# Patient Record
Sex: MALE | Race: WHITE | HISPANIC OR LATINO | Employment: FULL TIME | URBAN - METROPOLITAN AREA
[De-identification: names, ages, dates, MRNs, and addresses within clinical notes are randomized per-mention and may not be internally consistent; named-entity substitution may affect disease eponyms.]

---

## 2024-01-08 ENCOUNTER — HOSPITAL ENCOUNTER (OUTPATIENT)
Dept: RADIOLOGY | Facility: EXTERNAL LOCATION | Age: 24
Discharge: HOME | End: 2024-01-08

## 2024-01-08 DIAGNOSIS — M25.512 LEFT SHOULDER PAIN, UNSPECIFIED CHRONICITY: ICD-10-CM

## 2024-01-10 ENCOUNTER — APPOINTMENT (OUTPATIENT)
Dept: PRIMARY CARE | Facility: CLINIC | Age: 24
End: 2024-01-10
Payer: COMMERCIAL

## 2024-02-07 ENCOUNTER — APPOINTMENT (OUTPATIENT)
Dept: PRIMARY CARE | Facility: CLINIC | Age: 24
End: 2024-02-07
Payer: COMMERCIAL

## 2024-03-13 ENCOUNTER — OFFICE VISIT (OUTPATIENT)
Dept: PRIMARY CARE | Facility: CLINIC | Age: 24
End: 2024-03-13
Payer: COMMERCIAL

## 2024-03-13 VITALS
WEIGHT: 315 LBS | RESPIRATION RATE: 18 BRPM | BODY MASS INDEX: 36.45 KG/M2 | HEART RATE: 91 BPM | OXYGEN SATURATION: 98 % | SYSTOLIC BLOOD PRESSURE: 138 MMHG | HEIGHT: 78 IN | DIASTOLIC BLOOD PRESSURE: 86 MMHG | TEMPERATURE: 97.4 F

## 2024-03-13 DIAGNOSIS — E66.9 OBESITY (BMI 30-39.9): ICD-10-CM

## 2024-03-13 DIAGNOSIS — R25.2 LEG CRAMPS: ICD-10-CM

## 2024-03-13 DIAGNOSIS — S43.402A SPRAIN OF LEFT SHOULDER, UNSPECIFIED SHOULDER SPRAIN TYPE, INITIAL ENCOUNTER: ICD-10-CM

## 2024-03-13 DIAGNOSIS — R06.83 SNORING: ICD-10-CM

## 2024-03-13 DIAGNOSIS — R06.81 APNEA: ICD-10-CM

## 2024-03-13 DIAGNOSIS — Z00.00 ROUTINE GENERAL MEDICAL EXAMINATION AT A HEALTH CARE FACILITY: Primary | ICD-10-CM

## 2024-03-13 PROCEDURE — 1036F TOBACCO NON-USER: CPT | Performed by: FAMILY MEDICINE

## 2024-03-13 PROCEDURE — 99385 PREV VISIT NEW AGE 18-39: CPT | Performed by: FAMILY MEDICINE

## 2024-03-13 PROCEDURE — 99203 OFFICE O/P NEW LOW 30 MIN: CPT | Performed by: FAMILY MEDICINE

## 2024-03-13 RX ORDER — ALBUTEROL SULFATE 90 UG/1
2 AEROSOL, METERED RESPIRATORY (INHALATION) EVERY 6 HOURS PRN
COMMUNITY
Start: 2017-12-08

## 2024-03-13 ASSESSMENT — COLUMBIA-SUICIDE SEVERITY RATING SCALE - C-SSRS
2. HAVE YOU ACTUALLY HAD ANY THOUGHTS OF KILLING YOURSELF?: NO
1. IN THE PAST MONTH, HAVE YOU WISHED YOU WERE DEAD OR WISHED YOU COULD GO TO SLEEP AND NOT WAKE UP?: NO
6. HAVE YOU EVER DONE ANYTHING, STARTED TO DO ANYTHING, OR PREPARED TO DO ANYTHING TO END YOUR LIFE?: NO

## 2024-03-13 ASSESSMENT — ENCOUNTER SYMPTOMS
DEPRESSION: 0
GASTROINTESTINAL NEGATIVE: 1
NEUROLOGICAL NEGATIVE: 1
OCCASIONAL FEELINGS OF UNSTEADINESS: 0
CONSTITUTIONAL NEGATIVE: 1
RESPIRATORY NEGATIVE: 1
CARDIOVASCULAR NEGATIVE: 1
LOSS OF SENSATION IN FEET: 0
MUSCULOSKELETAL NEGATIVE: 1

## 2024-03-13 ASSESSMENT — PROMIS GLOBAL HEALTH SCALE
CARRYOUT_SOCIAL_ACTIVITIES: GOOD
RATE_MENTAL_HEALTH: FAIR
RATE_PHYSICAL_HEALTH: FAIR
CARRYOUT_PHYSICAL_ACTIVITIES: A LITTLE
RATE_AVERAGE_PAIN: 6
RATE_GENERAL_HEALTH: FAIR
RATE_AVERAGE_FATIGUE: MILD
RATE_QUALITY_OF_LIFE: FAIR
EMOTIONAL_PROBLEMS: SOMETIMES
RATE_SOCIAL_SATISFACTION: FAIR

## 2024-03-13 ASSESSMENT — PATIENT HEALTH QUESTIONNAIRE - PHQ9
1. LITTLE INTEREST OR PLEASURE IN DOING THINGS: NOT AT ALL
2. FEELING DOWN, DEPRESSED OR HOPELESS: NOT AT ALL
SUM OF ALL RESPONSES TO PHQ9 QUESTIONS 1 AND 2: 0

## 2024-03-13 ASSESSMENT — PAIN SCALES - GENERAL: PAINLEVEL: 4

## 2024-03-13 NOTE — PROGRESS NOTES
"Subjective   Patient ID: Uriel Gomez is a 23 y.o. male who presents for New Patient Visit (Pt is here to establish care, pt reports injury to left shoulder and would like it checked. Pt reports snoring and has concerns of sleep apnea. Pt would also like his thyroid checked.).    HPI chol, htn and diabetes in family.   Works at [employer].  He was lifting about 6 wks ago and felt a pop in his left shoulder and has been somewhat sore.  Occasional leg cramps.   He does snore a lot and has hypersomnia.  They have heard him stop breathing frequently recently also.  He drinks about 3 sodes and 2 16oz sweet teas daily.  He does not exercise.   Review of Systems   Constitutional: Negative.    HENT: Negative.     Respiratory: Negative.     Cardiovascular: Negative.    Gastrointestinal: Negative.    Genitourinary: Negative.    Musculoskeletal: Negative.    Neurological: Negative.        Objective   /86 (BP Location: Right arm, Patient Position: Sitting, BP Cuff Size: Large adult)   Pulse 91   Temp 36.3 °C (97.4 °F) (Temporal)   Resp 18   Ht 2.007 m (6' 7\")   Wt (!) 160 kg (352 lb)   SpO2 98%   BMI 39.65 kg/m²     Physical Exam  Vitals and nursing note reviewed.   Constitutional:       General: He is not in acute distress.  HENT:      Right Ear: Tympanic membrane and ear canal normal.      Left Ear: Tympanic membrane and ear canal normal.      Nose: Nose normal. No rhinorrhea.      Mouth/Throat:      Pharynx: Oropharynx is clear. No oropharyngeal exudate or posterior oropharyngeal erythema.      Comments: Dentition wnl  Eyes:      Extraocular Movements: Extraocular movements intact.      Conjunctiva/sclera: Conjunctivae normal.      Pupils: Pupils are equal, round, and reactive to light.   Neck:      Vascular: No carotid bruit.   Cardiovascular:      Rate and Rhythm: Normal rate and regular rhythm.      Heart sounds: Normal heart sounds. No murmur heard.  Pulmonary:      Breath sounds: Normal breath sounds. No " wheezing or rhonchi.   Abdominal:      General: Bowel sounds are normal. There is no distension.      Palpations: Abdomen is soft. There is no mass.      Tenderness: There is no abdominal tenderness. There is no guarding or rebound.      Hernia: No hernia is present.   Musculoskeletal:         General: No swelling or tenderness. Normal range of motion.      Cervical back: Normal range of motion and neck supple.      Comments: Left shoulder with slight click and mild tenderness over anterior shoulder with abduction to 90 degrees.  Mild tenderness ac jt.  Mild tenderness with internal rotation.    Lymphadenopathy:      Cervical: No cervical adenopathy.   Skin:     General: Skin is warm.      Findings: No rash.   Neurological:      General: No focal deficit present.      Mental Status: He is alert.         Assessment/Plan   Problem List Items Addressed This Visit    None  Visit Diagnoses         Codes    Routine general medical examination at a health care facility    -  Primary Z00.00    Relevant Orders    CBC and Auto Differential    Comprehensive Metabolic Panel    Lipid Panel    TSH with reflex to Free T4 if abnormal    Sprain of left shoulder, unspecified shoulder sprain type, initial encounter     S43.402A    Relevant Orders    Referral to Physical Therapy    Apnea     R06.81    Relevant Orders    In-Center Sleep Study (Non-Sleep Provider Only)    Snoring     R06.83    Relevant Orders    In-Center Sleep Study (Non-Sleep Provider Only)    Obesity (BMI 30-39.9)     E66.9    Relevant Orders    In-Center Sleep Study (Non-Sleep Provider Only)    Leg cramps     R25.2          Discussed dc soda/sweet tea.  Work on good diet/exercise and wt loss.  Will call with results. And start PT on shoulder.

## 2024-03-21 ENCOUNTER — CLINICAL SUPPORT (OUTPATIENT)
Dept: PRIMARY CARE | Facility: CLINIC | Age: 24
End: 2024-03-21
Payer: COMMERCIAL

## 2024-03-21 DIAGNOSIS — Z00.00 ROUTINE GENERAL MEDICAL EXAMINATION AT A HEALTH CARE FACILITY: ICD-10-CM

## 2024-03-21 LAB
ALBUMIN SERPL-MCNC: 4.5 G/DL (ref 3.5–5)
ALP BLD-CCNC: 51 U/L (ref 35–125)
ALT SERPL-CCNC: 39 U/L (ref 5–40)
ANION GAP SERPL CALC-SCNC: 11 MMOL/L
AST SERPL-CCNC: 20 U/L (ref 5–40)
BASOPHILS # BLD AUTO: 0.04 X10*3/UL (ref 0–0.1)
BASOPHILS NFR BLD AUTO: 0.5 %
BILIRUB SERPL-MCNC: 0.5 MG/DL (ref 0.1–1.2)
BUN SERPL-MCNC: 10 MG/DL (ref 8–25)
CALCIUM SERPL-MCNC: 9.4 MG/DL (ref 8.5–10.4)
CHLORIDE SERPL-SCNC: 105 MMOL/L (ref 97–107)
CHOLEST SERPL-MCNC: 151 MG/DL (ref 133–200)
CHOLEST/HDLC SERPL: 4.7 {RATIO}
CO2 SERPL-SCNC: 25 MMOL/L (ref 24–31)
CREAT SERPL-MCNC: 0.7 MG/DL (ref 0.4–1.6)
EGFRCR SERPLBLD CKD-EPI 2021: >90 ML/MIN/1.73M*2
EOSINOPHIL # BLD AUTO: 0.16 X10*3/UL (ref 0–0.7)
EOSINOPHIL NFR BLD AUTO: 2.1 %
ERYTHROCYTE [DISTWIDTH] IN BLOOD BY AUTOMATED COUNT: 13.7 % (ref 11.5–14.5)
GLUCOSE SERPL-MCNC: 91 MG/DL (ref 65–99)
HCT VFR BLD AUTO: 44.9 % (ref 41–52)
HDLC SERPL-MCNC: 32 MG/DL
HGB BLD-MCNC: 14.1 G/DL (ref 13.5–17.5)
IMM GRANULOCYTES # BLD AUTO: 0.05 X10*3/UL (ref 0–0.7)
IMM GRANULOCYTES NFR BLD AUTO: 0.7 % (ref 0–0.9)
LDLC SERPL CALC-MCNC: 89 MG/DL (ref 65–130)
LYMPHOCYTES # BLD AUTO: 2.53 X10*3/UL (ref 1.2–4.8)
LYMPHOCYTES NFR BLD AUTO: 33.5 %
MCH RBC QN AUTO: 29.7 PG (ref 26–34)
MCHC RBC AUTO-ENTMCNC: 31.4 G/DL (ref 32–36)
MCV RBC AUTO: 95 FL (ref 80–100)
MONOCYTES # BLD AUTO: 0.64 X10*3/UL (ref 0.1–1)
MONOCYTES NFR BLD AUTO: 8.5 %
NEUTROPHILS # BLD AUTO: 4.13 X10*3/UL (ref 1.2–7.7)
NEUTROPHILS NFR BLD AUTO: 54.7 %
NRBC BLD-RTO: 0 /100 WBCS (ref 0–0)
PLATELET # BLD AUTO: 229 X10*3/UL (ref 150–450)
POTASSIUM SERPL-SCNC: 4.5 MMOL/L (ref 3.4–5.1)
PROT SERPL-MCNC: 7.7 G/DL (ref 5.9–7.9)
RBC # BLD AUTO: 4.75 X10*6/UL (ref 4.5–5.9)
SODIUM SERPL-SCNC: 141 MMOL/L (ref 133–145)
TRIGL SERPL-MCNC: 150 MG/DL (ref 40–150)
TSH SERPL DL<=0.05 MIU/L-ACNC: 1.37 MIU/L (ref 0.27–4.2)
WBC # BLD AUTO: 7.6 X10*3/UL (ref 4.4–11.3)

## 2024-03-21 PROCEDURE — 36415 COLL VENOUS BLD VENIPUNCTURE: CPT

## 2024-03-21 PROCEDURE — 80053 COMPREHEN METABOLIC PANEL: CPT

## 2024-03-21 PROCEDURE — 85025 COMPLETE CBC W/AUTO DIFF WBC: CPT

## 2024-03-21 PROCEDURE — 84443 ASSAY THYROID STIM HORMONE: CPT

## 2024-03-21 PROCEDURE — 83718 ASSAY OF LIPOPROTEIN: CPT

## 2024-04-16 ENCOUNTER — CLINICAL SUPPORT (OUTPATIENT)
Dept: SLEEP MEDICINE | Facility: CLINIC | Age: 24
End: 2024-04-16
Payer: COMMERCIAL

## 2024-04-16 DIAGNOSIS — R06.81 APNEA: ICD-10-CM

## 2024-04-16 DIAGNOSIS — G47.33 OBSTRUCTIVE SLEEP APNEA (ADULT) (PEDIATRIC): ICD-10-CM

## 2024-04-16 DIAGNOSIS — R06.83 SNORING: ICD-10-CM

## 2024-04-16 DIAGNOSIS — E66.9 OBESITY (BMI 30-39.9): ICD-10-CM

## 2024-04-16 PROCEDURE — 95810 POLYSOM 6/> YRS 4/> PARAM: CPT | Performed by: PSYCHIATRY & NEUROLOGY

## 2024-04-17 VITALS
DIASTOLIC BLOOD PRESSURE: 72 MMHG | OXYGEN SATURATION: 99 % | HEART RATE: 68 BPM | SYSTOLIC BLOOD PRESSURE: 127 MMHG | RESPIRATION RATE: 14 BRPM

## 2024-04-17 ASSESSMENT — SLEEP AND FATIGUE QUESTIONNAIRES
HOW LIKELY ARE YOU TO NOD OFF OR FALL ASLEEP WHILE SITTING AND TALKING TO SOMEONE: WOULD NEVER DOZE
HOW LIKELY ARE YOU TO NOD OFF OR FALL ASLEEP WHILE SITTING AND READING: MODERATE CHANCE OF DOZING
HOW LIKELY ARE YOU TO NOD OFF OR FALL ASLEEP WHILE LYING DOWN TO REST IN THE AFTERNOON WHEN CIRCUMSTANCES PERMIT: HIGH CHANCE OF DOZING
HOW LIKELY ARE YOU TO NOD OFF OR FALL ASLEEP WHILE WATCHING TV: WOULD NEVER DOZE
HOW LIKELY ARE YOU TO NOD OFF OR FALL ASLEEP IN A CAR, WHILE STOPPED FOR A FEW MINUTES IN TRAFFIC: WOULD NEVER DOZE
HOW LIKELY ARE YOU TO NOD OFF OR FALL ASLEEP WHILE SITTING QUIETLY AFTER LUNCH WITHOUT ALCOHOL: WOULD NEVER DOZE
ESS-CHAD TOTAL SCORE: 8
SITING INACTIVE IN A PUBLIC PLACE LIKE A CLASS ROOM OR A MOVIE THEATER: MODERATE CHANCE OF DOZING
HOW LIKELY ARE YOU TO NOD OFF OR FALL ASLEEP WHEN YOU ARE A PASSENGER IN A CAR FOR AN HOUR WITHOUT A BREAK: SLIGHT CHANCE OF DOZING

## 2024-04-17 NOTE — PROGRESS NOTES
[facility] TECH NOTE:     Patient: Uriel Gomez   MRN//AGE: [id number]  2000  23 y.o.   Technologist: Анна Hutchinson RRT-[facility]   Room: 106   Service Date: 2024        Sleep Testing Location: [facility]:     TECHNOLOGIST SLEEP STUDY PROCEDURE NOTE:   This sleep study is being conducted according to the policies and procedures outlined by the AAS accreditation standards.  The sleep study procedure and processes involved during this appointment was explained to the patient/patient’s family, questions were answered. The patient/family verbalized understanding.      The patient is a 23 y.o. year old male scheduled for a Diagnostic PSG Split night. he arrived for his appointment.      The study that was ultimately completed was a Diagnostic PSG .    The full study Was completed.  Patient questionnaires completed?: yes     Consents signed? yes    Initial Fall Risk Screening:     Uriel has not fallen in the last 6 months. his did not result in injury. Uriel does not have a fear of falling. He does not need assistance with sitting, standing, or walking. he does not need assistance walking in his home. he does not need assistance in an unfamiliar setting. The patient is notusing an assistive device.     Brief Study observations: Patient did not qualify for a Split study. Patient had Arousals of Respiratory, Spontaneaous and Limb movements. Patients Respiratory events were Obstructive, Hypopneas and Flow Limitations. Patient had intermittent, mild to moderate snoring. Patient was up 0 times throughout the night. Patient did go into REM.     Deviation to order/protocol and reason:       If PAP, which was preferred mask/pressure/mode:       Other:None    After the procedure, the patient/family was informed to ensure followup with ordering clinician for testing results.      Technologist: Анна Hutchinson RRT

## 2024-05-13 ENCOUNTER — APPOINTMENT (OUTPATIENT)
Dept: RADIOLOGY | Facility: HOSPITAL | Age: 24
End: 2024-05-13
Payer: COMMERCIAL

## 2024-05-13 ENCOUNTER — HOSPITAL ENCOUNTER (EMERGENCY)
Facility: HOSPITAL | Age: 24
Discharge: HOME | End: 2024-05-13
Attending: FAMILY MEDICINE
Payer: COMMERCIAL

## 2024-05-13 VITALS
SYSTOLIC BLOOD PRESSURE: 145 MMHG | DIASTOLIC BLOOD PRESSURE: 91 MMHG | TEMPERATURE: 97 F | HEIGHT: 78 IN | WEIGHT: 315 LBS | BODY MASS INDEX: 36.45 KG/M2 | HEART RATE: 76 BPM | OXYGEN SATURATION: 98 % | RESPIRATION RATE: 17 BRPM

## 2024-05-13 DIAGNOSIS — R10.31 RIGHT LOWER QUADRANT ABDOMINAL PAIN: Primary | ICD-10-CM

## 2024-05-13 LAB
ALBUMIN SERPL BCP-MCNC: 4.5 G/DL (ref 3.4–5)
ALP SERPL-CCNC: 43 U/L (ref 33–120)
ALT SERPL W P-5'-P-CCNC: 36 U/L (ref 10–52)
ANION GAP SERPL CALC-SCNC: 10 MMOL/L (ref 10–20)
APPEARANCE UR: CLEAR
AST SERPL W P-5'-P-CCNC: 21 U/L (ref 9–39)
BASOPHILS # BLD AUTO: 0.03 X10*3/UL (ref 0–0.1)
BASOPHILS NFR BLD AUTO: 0.4 %
BILIRUB SERPL-MCNC: 0.5 MG/DL (ref 0–1.2)
BILIRUB UR STRIP.AUTO-MCNC: NEGATIVE MG/DL
BUN SERPL-MCNC: 14 MG/DL (ref 6–23)
CALCIUM SERPL-MCNC: 9.3 MG/DL (ref 8.6–10.3)
CHLORIDE SERPL-SCNC: 107 MMOL/L (ref 98–107)
CO2 SERPL-SCNC: 27 MMOL/L (ref 21–32)
COLOR UR: ABNORMAL
CREAT SERPL-MCNC: 0.79 MG/DL (ref 0.5–1.3)
EGFRCR SERPLBLD CKD-EPI 2021: >90 ML/MIN/1.73M*2
EOSINOPHIL # BLD AUTO: 0.12 X10*3/UL (ref 0–0.7)
EOSINOPHIL NFR BLD AUTO: 1.5 %
ERYTHROCYTE [DISTWIDTH] IN BLOOD BY AUTOMATED COUNT: 13.5 % (ref 11.5–14.5)
GLUCOSE SERPL-MCNC: 85 MG/DL (ref 74–99)
GLUCOSE UR STRIP.AUTO-MCNC: NORMAL MG/DL
HCT VFR BLD AUTO: 39.1 % (ref 41–52)
HGB BLD-MCNC: 13.2 G/DL (ref 13.5–17.5)
IMM GRANULOCYTES # BLD AUTO: 0.03 X10*3/UL (ref 0–0.7)
IMM GRANULOCYTES NFR BLD AUTO: 0.4 % (ref 0–0.9)
KETONES UR STRIP.AUTO-MCNC: NEGATIVE MG/DL
LEUKOCYTE ESTERASE UR QL STRIP.AUTO: NEGATIVE
LIPASE SERPL-CCNC: 32 U/L (ref 9–82)
LYMPHOCYTES # BLD AUTO: 3.03 X10*3/UL (ref 1.2–4.8)
LYMPHOCYTES NFR BLD AUTO: 36.6 %
MCH RBC QN AUTO: 30.1 PG (ref 26–34)
MCHC RBC AUTO-ENTMCNC: 33.8 G/DL (ref 32–36)
MCV RBC AUTO: 89 FL (ref 80–100)
MONOCYTES # BLD AUTO: 0.66 X10*3/UL (ref 0.1–1)
MONOCYTES NFR BLD AUTO: 8 %
NEUTROPHILS # BLD AUTO: 4.4 X10*3/UL (ref 1.2–7.7)
NEUTROPHILS NFR BLD AUTO: 53.1 %
NITRITE UR QL STRIP.AUTO: NEGATIVE
NRBC BLD-RTO: 0 /100 WBCS (ref 0–0)
PH UR STRIP.AUTO: 6.5 [PH]
PLATELET # BLD AUTO: 232 X10*3/UL (ref 150–450)
POTASSIUM SERPL-SCNC: 3.7 MMOL/L (ref 3.5–5.3)
PROT SERPL-MCNC: 7.5 G/DL (ref 6.4–8.2)
PROT UR STRIP.AUTO-MCNC: NEGATIVE MG/DL
RBC # BLD AUTO: 4.39 X10*6/UL (ref 4.5–5.9)
RBC # UR STRIP.AUTO: NEGATIVE /UL
SODIUM SERPL-SCNC: 140 MMOL/L (ref 136–145)
SP GR UR STRIP.AUTO: 1.02
UROBILINOGEN UR STRIP.AUTO-MCNC: ABNORMAL MG/DL
WBC # BLD AUTO: 8.3 X10*3/UL (ref 4.4–11.3)

## 2024-05-13 PROCEDURE — 96361 HYDRATE IV INFUSION ADD-ON: CPT

## 2024-05-13 PROCEDURE — 36415 COLL VENOUS BLD VENIPUNCTURE: CPT | Performed by: FAMILY MEDICINE

## 2024-05-13 PROCEDURE — 96374 THER/PROPH/DIAG INJ IV PUSH: CPT | Mod: 59

## 2024-05-13 PROCEDURE — 74177 CT ABD & PELVIS W/CONTRAST: CPT

## 2024-05-13 PROCEDURE — 81003 URINALYSIS AUTO W/O SCOPE: CPT | Performed by: FAMILY MEDICINE

## 2024-05-13 PROCEDURE — 99284 EMERGENCY DEPT VISIT MOD MDM: CPT | Mod: 25

## 2024-05-13 PROCEDURE — 80053 COMPREHEN METABOLIC PANEL: CPT | Performed by: FAMILY MEDICINE

## 2024-05-13 PROCEDURE — 83690 ASSAY OF LIPASE: CPT | Performed by: FAMILY MEDICINE

## 2024-05-13 PROCEDURE — 85025 COMPLETE CBC W/AUTO DIFF WBC: CPT | Performed by: FAMILY MEDICINE

## 2024-05-13 PROCEDURE — 2550000001 HC RX 255 CONTRASTS: Performed by: FAMILY MEDICINE

## 2024-05-13 PROCEDURE — 74177 CT ABD & PELVIS W/CONTRAST: CPT | Performed by: RADIOLOGY

## 2024-05-13 PROCEDURE — 2500000004 HC RX 250 GENERAL PHARMACY W/ HCPCS (ALT 636 FOR OP/ED): Performed by: FAMILY MEDICINE

## 2024-05-13 RX ORDER — KETOROLAC TROMETHAMINE 15 MG/ML
15 INJECTION, SOLUTION INTRAMUSCULAR; INTRAVENOUS ONCE
Status: COMPLETED | OUTPATIENT
Start: 2024-05-13 | End: 2024-05-13

## 2024-05-13 RX ADMIN — SODIUM CHLORIDE 500 ML: 9 INJECTION, SOLUTION INTRAVENOUS at 20:33

## 2024-05-13 RX ADMIN — KETOROLAC TROMETHAMINE 15 MG: 15 INJECTION INTRAMUSCULAR; INTRAVENOUS at 20:33

## 2024-05-13 RX ADMIN — IOHEXOL 75 ML: 350 INJECTION, SOLUTION INTRAVENOUS at 21:46

## 2024-05-13 ASSESSMENT — PAIN SCALES - GENERAL
PAINLEVEL_OUTOF10: 1
PAINLEVEL_OUTOF10: 6
PAINLEVEL_OUTOF10: 7

## 2024-05-13 ASSESSMENT — PAIN DESCRIPTION - PAIN TYPE: TYPE: ACUTE PAIN

## 2024-05-13 ASSESSMENT — PAIN DESCRIPTION - FREQUENCY: FREQUENCY: CONSTANT/CONTINUOUS

## 2024-05-13 ASSESSMENT — PAIN DESCRIPTION - LOCATION: LOCATION: ABDOMEN

## 2024-05-13 ASSESSMENT — PAIN - FUNCTIONAL ASSESSMENT: PAIN_FUNCTIONAL_ASSESSMENT: 0-10

## 2024-05-13 ASSESSMENT — COLUMBIA-SUICIDE SEVERITY RATING SCALE - C-SSRS
6. HAVE YOU EVER DONE ANYTHING, STARTED TO DO ANYTHING, OR PREPARED TO DO ANYTHING TO END YOUR LIFE?: NO
2. HAVE YOU ACTUALLY HAD ANY THOUGHTS OF KILLING YOURSELF?: NO
1. IN THE PAST MONTH, HAVE YOU WISHED YOU WERE DEAD OR WISHED YOU COULD GO TO SLEEP AND NOT WAKE UP?: NO

## 2024-05-13 ASSESSMENT — PAIN DESCRIPTION - DESCRIPTORS: DESCRIPTORS: PRESSURE

## 2024-05-14 LAB — HOLD SPECIMEN: NORMAL

## 2024-05-14 NOTE — ED PROVIDER NOTES
HPI   Chief Complaint   Patient presents with    Abdominal Pain     Abdominal pain       23-year-old male comes to the ED with complaint of periumbilical/right lower quadrant abdominal pain that began earlier today.  Patient reports no other associate symptoms or complaints.  Patient reported as a heaviness states it was 6/10.  Patient has ED did not take anything to treat it and continue to persist and he was concerned and came to the ED for evaluation.  Patient in the ED is alert, cooperative, appeared uncomfortable, but in no distress.  Patient currently denies headache, neck pain, chest pain, back pain, shortness of breath, fevers, falls, recent travel, sick contacts, nausea/vomiting/diarrhea, change in diet, dysuria, hematuria, dizziness, and weakness.      History provided by:  Patient, medical records and significant other   used: No                        [place] Coma Scale Score: 15                     Patient History   History reviewed. No pertinent past medical history.  History reviewed. No pertinent surgical history.  Family History   Problem Relation Name Age of Onset    Mental illness Mother      Hypertension Mother      Diabetes Mother      Cancer Mother      Hypertension Father       Social History     Tobacco Use    Smoking status: Never    Smokeless tobacco: Never   Substance Use Topics    Alcohol use: Never    Drug use: Never       Physical Exam   ED Triage Vitals [05/13/24 1932]   Temperature Heart Rate Respirations BP   36.1 °C (97 °F) 82 16 (!) 145/92      Pulse Ox Temp Source Heart Rate Source Patient Position   96 % Tympanic -- Sitting      BP Location FiO2 (%)     Left arm --       Physical Exam  Vitals and nursing note reviewed.   Constitutional:       General: He is not in acute distress.     Appearance: He is well-developed.   HENT:      Head: Normocephalic and atraumatic.   Eyes:      Conjunctiva/sclera: Conjunctivae normal.   Cardiovascular:      Rate and Rhythm:  Normal rate and regular rhythm.      Pulses: Normal pulses.      Heart sounds: Normal heart sounds, S1 normal and S2 normal. No murmur heard.  Pulmonary:      Effort: Pulmonary effort is normal. No respiratory distress.      Breath sounds: Normal breath sounds.   Abdominal:      General: Abdomen is flat.      Palpations: Abdomen is soft.      Tenderness: There is abdominal tenderness in the right lower quadrant and periumbilical area. There is no right CVA tenderness, left CVA tenderness, guarding or rebound.      Hernia: No hernia is present.       Musculoskeletal:         General: No swelling.      Cervical back: Neck supple.   Skin:     General: Skin is warm and dry.      Capillary Refill: Capillary refill takes less than 2 seconds.   Neurological:      Mental Status: He is alert.   Psychiatric:         Mood and Affect: Mood normal.         ED Course & MDM   Diagnoses as of 05/14/24 0046   Right lower quadrant abdominal pain     Labs Reviewed   CBC WITH AUTO DIFFERENTIAL - Abnormal       Result Value    WBC 8.3      nRBC 0.0      RBC 4.39 (*)     Hemoglobin 13.2 (*)     Hematocrit 39.1 (*)     MCV 89      MCH 30.1      MCHC 33.8      RDW 13.5      Platelets 232      Neutrophils % 53.1      Immature Granulocytes %, Automated 0.4      Lymphocytes % 36.6      Monocytes % 8.0      Eosinophils % 1.5      Basophils % 0.4      Neutrophils Absolute 4.40      Immature Granulocytes Absolute, Automated 0.03      Lymphocytes Absolute 3.03      Monocytes Absolute 0.66      Eosinophils Absolute 0.12      Basophils Absolute 0.03     URINALYSIS WITH REFLEX CULTURE AND MICROSCOPIC - Abnormal    Color, Urine Light-Yellow      Appearance, Urine Clear      Specific Gravity, Urine 1.025      pH, Urine 6.5      Protein, Urine NEGATIVE      Glucose, Urine Normal      Blood, Urine NEGATIVE      Ketones, Urine NEGATIVE      Bilirubin, Urine NEGATIVE      Urobilinogen, Urine 3 (1+) (*)     Nitrite, Urine NEGATIVE      Leukocyte Esterase,  Urine NEGATIVE     COMPREHENSIVE METABOLIC PANEL - Normal    Glucose 85      Sodium 140      Potassium 3.7      Chloride 107      Bicarbonate 27      Anion Gap 10      Urea Nitrogen 14      Creatinine 0.79      eGFR >90      Calcium 9.3      Albumin 4.5      Alkaline Phosphatase 43      Total Protein 7.5      AST 21      Bilirubin, Total 0.5      ALT 36     LIPASE - Normal    Lipase 32      Narrative:     Venipuncture immediately after or during the administration of Metamizole may lead to falsely low results. Testing should be performed immediately prior to Metamizole dosing.   URINALYSIS WITH REFLEX CULTURE AND MICROSCOPIC    Narrative:     The following orders were created for panel order Urinalysis with Reflex Culture and Microscopic.  Procedure                               Abnormality         Status                     ---------                               -----------         ------                     Urinalysis with Reflex C...[[id number]]  Abnormal            Final result               Extra Urine Gray Tube[[id number]]                            In process                   Please view results for these tests on the individual orders.   EXTRA URINE GRAY TUBE     CT abdomen pelvis w IV contrast   Final Result   1.  Progressive hepatomegaly with evidence of steatosis.   2. Cholelithiasis.   3. Similar mild abdominal and pelvic lymphadenopathy.   4. Similar splenomegaly.             Signed by: Ruddy De La Cruz 5/13/2024 10:52 PM   Dictation workstation:   [id number]          Medical Decision Making  Patient upon arrival to the ED appeared to be anxious and uncomfortable but in no distress stable vital signs.  Discussed with patient presenting complaints and clinical findings.  Reviewed with patient his epic chart and counseled patient abdominal pain and appropriate approach to management/treatments.  After assessment and evaluation IV fluids are started, labs sent, imaging ordered, given IV Toradol, placed on  cardiac monitor, and observed.  After treatment and a period of rest patient was reassessed finally feeling much better, pain better controlled, no new physical complaints, tolerated p.o. challenge, and vital signs were stable.  Final results were reviewed discussed with patient/family and at this time patient was advised on need for dietary changes and better hydration.  Patient was also advised to follow-up with his primary care doctor for recheck and and reevaluation in several days and to review the findings on the CT scan.  Patient also advised return to ED if he feels any recurrence of abdominal pain for reassessment.  Patient stable and discharged home with family.    Amount and/or Complexity of Data Reviewed  External Data Reviewed: labs, radiology and notes.  Labs: ordered. Decision-making details documented in ED Course.  Radiology: ordered. Decision-making details documented in ED Course.    Risk  OTC drugs.        Procedure  Procedures     Rivas Barrientos MD  05/14/24 0132

## 2024-05-22 ENCOUNTER — APPOINTMENT (OUTPATIENT)
Dept: PRIMARY CARE | Facility: CLINIC | Age: 24
End: 2024-05-22
Payer: COMMERCIAL

## 2024-05-23 ENCOUNTER — OFFICE VISIT (OUTPATIENT)
Dept: PRIMARY CARE | Facility: CLINIC | Age: 24
End: 2024-05-23
Payer: COMMERCIAL

## 2024-05-23 VITALS
OXYGEN SATURATION: 98 % | BODY MASS INDEX: 40.56 KG/M2 | HEIGHT: 78 IN | TEMPERATURE: 98.9 F | DIASTOLIC BLOOD PRESSURE: 94 MMHG | SYSTOLIC BLOOD PRESSURE: 160 MMHG | RESPIRATION RATE: 20 BRPM | HEART RATE: 106 BPM

## 2024-05-23 DIAGNOSIS — G47.33 OBSTRUCTIVE SLEEP APNEA SYNDROME: ICD-10-CM

## 2024-05-23 DIAGNOSIS — R16.2 HEPATOSPLENOMEGALY: ICD-10-CM

## 2024-05-23 DIAGNOSIS — K80.20 CALCULUS OF GALLBLADDER WITHOUT CHOLECYSTITIS WITHOUT OBSTRUCTION: Primary | ICD-10-CM

## 2024-05-23 PROCEDURE — 99214 OFFICE O/P EST MOD 30 MIN: CPT | Performed by: FAMILY MEDICINE

## 2024-05-23 ASSESSMENT — PAIN SCALES - GENERAL: PAINLEVEL: 4

## 2024-05-23 ASSESSMENT — PATIENT HEALTH QUESTIONNAIRE - PHQ9
2. FEELING DOWN, DEPRESSED OR HOPELESS: NOT AT ALL
1. LITTLE INTEREST OR PLEASURE IN DOING THINGS: NOT AT ALL
SUM OF ALL RESPONSES TO PHQ9 QUESTIONS 1 AND 2: 0

## 2024-05-23 ASSESSMENT — ENCOUNTER SYMPTOMS
OCCASIONAL FEELINGS OF UNSTEADINESS: 0
DEPRESSION: 0
LOSS OF SENSATION IN FEET: 0

## 2024-05-23 NOTE — PROGRESS NOTES
"Subjective   Patient ID: Uriel Gomez is a 23 y.o. male who presents for Follow-up (HERE FOR A ER FOLLOW UP  FOR ABDOMINAL PAIN  HE WOULD LIKE TO GO OVER RESULTS.  HE WENT ON MAY 13TH).    HPI   He went to the er on 5/13 for umbilical and RLQ pain starting that day. He had ct abdomen that showed some hepato and splenomegaly as well as cholelithiasis.   Review of Systems  See HPI  Objective   BP (!) 160/94 (BP Location: Right arm, Patient Position: Sitting, BP Cuff Size: Adult)   Pulse 106   Temp 37.2 °C (98.9 °F) (Skin)   Resp 20   Ht 2.007 m (6' 7\")   SpO2 98%   BMI 40.56 kg/m²     Physical Exam  Constitutional:       General: He is not in acute distress.     Appearance: Normal appearance.   Cardiovascular:      Rate and Rhythm: Normal rate and regular rhythm.      Heart sounds: No murmur heard.  Pulmonary:      Breath sounds: Normal breath sounds. No wheezing.   Abdominal:      Comments: Mild RUQ tenderness to palpation   Neurological:      Mental Status: He is alert.         Assessment/Plan   Problem List Items Addressed This Visit    None  Visit Diagnoses         Codes    Calculus of gallbladder without cholecystitis without obstruction    -  Primary K80.20    Relevant Orders    Referral to General Surgery    Hepatosplenomegaly     R16.2    Relevant Orders    Referral to Gastroenterology    Obstructive sleep apnea syndrome     G47.33               "

## 2024-05-28 ENCOUNTER — APPOINTMENT (OUTPATIENT)
Dept: RADIOLOGY | Facility: HOSPITAL | Age: 24
End: 2024-05-28
Payer: COMMERCIAL

## 2024-05-28 ENCOUNTER — HOSPITAL ENCOUNTER (EMERGENCY)
Facility: HOSPITAL | Age: 24
Discharge: HOME | End: 2024-05-29
Payer: COMMERCIAL

## 2024-05-28 ENCOUNTER — APPOINTMENT (OUTPATIENT)
Dept: CARDIOLOGY | Facility: HOSPITAL | Age: 24
End: 2024-05-28
Payer: COMMERCIAL

## 2024-05-28 DIAGNOSIS — K80.50 BILIARY COLIC: ICD-10-CM

## 2024-05-28 DIAGNOSIS — K80.20 CALCULUS OF GALLBLADDER WITHOUT CHOLECYSTITIS WITHOUT OBSTRUCTION: ICD-10-CM

## 2024-05-28 DIAGNOSIS — R10.11 RIGHT UPPER QUADRANT ABDOMINAL PAIN: Primary | ICD-10-CM

## 2024-05-28 LAB
ALBUMIN SERPL BCP-MCNC: 4.4 G/DL (ref 3.4–5)
ALP SERPL-CCNC: 45 U/L (ref 33–120)
ALT SERPL W P-5'-P-CCNC: 44 U/L (ref 10–52)
ANION GAP SERPL CALC-SCNC: 12 MMOL/L (ref 10–20)
APPEARANCE UR: CLEAR
AST SERPL W P-5'-P-CCNC: 21 U/L (ref 9–39)
BACTERIA #/AREA URNS AUTO: ABNORMAL /HPF
BASOPHILS # BLD AUTO: 0.04 X10*3/UL (ref 0–0.1)
BASOPHILS NFR BLD AUTO: 0.5 %
BILIRUB SERPL-MCNC: 0.5 MG/DL (ref 0–1.2)
BILIRUB UR STRIP.AUTO-MCNC: NEGATIVE MG/DL
BUN SERPL-MCNC: 13 MG/DL (ref 6–23)
CALCIUM SERPL-MCNC: 9.6 MG/DL (ref 8.6–10.3)
CARDIAC TROPONIN I PNL SERPL HS: <3 NG/L (ref 0–20)
CHLORIDE SERPL-SCNC: 107 MMOL/L (ref 98–107)
CO2 SERPL-SCNC: 25 MMOL/L (ref 21–32)
COLOR UR: YELLOW
CREAT SERPL-MCNC: 0.78 MG/DL (ref 0.5–1.3)
EGFRCR SERPLBLD CKD-EPI 2021: >90 ML/MIN/1.73M*2
EOSINOPHIL # BLD AUTO: 0.16 X10*3/UL (ref 0–0.7)
EOSINOPHIL NFR BLD AUTO: 2 %
ERYTHROCYTE [DISTWIDTH] IN BLOOD BY AUTOMATED COUNT: 13.4 % (ref 11.5–14.5)
GLUCOSE SERPL-MCNC: 93 MG/DL (ref 74–99)
GLUCOSE UR STRIP.AUTO-MCNC: NORMAL MG/DL
HCT VFR BLD AUTO: 38.8 % (ref 41–52)
HGB BLD-MCNC: 13.2 G/DL (ref 13.5–17.5)
IMM GRANULOCYTES # BLD AUTO: 0.02 X10*3/UL (ref 0–0.7)
IMM GRANULOCYTES NFR BLD AUTO: 0.3 % (ref 0–0.9)
KETONES UR STRIP.AUTO-MCNC: NEGATIVE MG/DL
LACTATE SERPL-SCNC: 1.1 MMOL/L (ref 0.4–2)
LEUKOCYTE ESTERASE UR QL STRIP.AUTO: NEGATIVE
LIPASE SERPL-CCNC: 33 U/L (ref 9–82)
LYMPHOCYTES # BLD AUTO: 2.8 X10*3/UL (ref 1.2–4.8)
LYMPHOCYTES NFR BLD AUTO: 35.3 %
MAGNESIUM SERPL-MCNC: 1.78 MG/DL (ref 1.6–2.4)
MCH RBC QN AUTO: 30.1 PG (ref 26–34)
MCHC RBC AUTO-ENTMCNC: 34 G/DL (ref 32–36)
MCV RBC AUTO: 89 FL (ref 80–100)
MONOCYTES # BLD AUTO: 0.84 X10*3/UL (ref 0.1–1)
MONOCYTES NFR BLD AUTO: 10.6 %
MUCOUS THREADS #/AREA URNS AUTO: ABNORMAL /LPF
NEUTROPHILS # BLD AUTO: 4.07 X10*3/UL (ref 1.2–7.7)
NEUTROPHILS NFR BLD AUTO: 51.3 %
NITRITE UR QL STRIP.AUTO: NEGATIVE
NRBC BLD-RTO: 0 /100 WBCS (ref 0–0)
PH UR STRIP.AUTO: 6 [PH]
PLATELET # BLD AUTO: 218 X10*3/UL (ref 150–450)
POTASSIUM SERPL-SCNC: 3.9 MMOL/L (ref 3.5–5.3)
PROT SERPL-MCNC: 7.6 G/DL (ref 6.4–8.2)
PROT UR STRIP.AUTO-MCNC: NORMAL MG/DL
RBC # BLD AUTO: 4.38 X10*6/UL (ref 4.5–5.9)
RBC # UR STRIP.AUTO: NEGATIVE /UL
RBC #/AREA URNS AUTO: ABNORMAL /HPF
SODIUM SERPL-SCNC: 140 MMOL/L (ref 136–145)
SP GR UR STRIP.AUTO: 1.03
UROBILINOGEN UR STRIP.AUTO-MCNC: NORMAL MG/DL
WBC # BLD AUTO: 7.9 X10*3/UL (ref 4.4–11.3)
WBC #/AREA URNS AUTO: ABNORMAL /HPF

## 2024-05-28 PROCEDURE — 83735 ASSAY OF MAGNESIUM: CPT

## 2024-05-28 PROCEDURE — 76705 ECHO EXAM OF ABDOMEN: CPT

## 2024-05-28 PROCEDURE — 2500000004 HC RX 250 GENERAL PHARMACY W/ HCPCS (ALT 636 FOR OP/ED)

## 2024-05-28 PROCEDURE — 85025 COMPLETE CBC W/AUTO DIFF WBC: CPT

## 2024-05-28 PROCEDURE — 93005 ELECTROCARDIOGRAM TRACING: CPT

## 2024-05-28 PROCEDURE — 84484 ASSAY OF TROPONIN QUANT: CPT

## 2024-05-28 PROCEDURE — 96361 HYDRATE IV INFUSION ADD-ON: CPT

## 2024-05-28 PROCEDURE — 71045 X-RAY EXAM CHEST 1 VIEW: CPT | Performed by: RADIOLOGY

## 2024-05-28 PROCEDURE — 74177 CT ABD & PELVIS W/CONTRAST: CPT | Performed by: RADIOLOGY

## 2024-05-28 PROCEDURE — 36415 COLL VENOUS BLD VENIPUNCTURE: CPT

## 2024-05-28 PROCEDURE — 99285 EMERGENCY DEPT VISIT HI MDM: CPT | Mod: 25

## 2024-05-28 PROCEDURE — 76705 ECHO EXAM OF ABDOMEN: CPT | Performed by: RADIOLOGY

## 2024-05-28 PROCEDURE — 74177 CT ABD & PELVIS W/CONTRAST: CPT

## 2024-05-28 PROCEDURE — 80053 COMPREHEN METABOLIC PANEL: CPT

## 2024-05-28 PROCEDURE — 2550000001 HC RX 255 CONTRASTS

## 2024-05-28 PROCEDURE — 96375 TX/PRO/DX INJ NEW DRUG ADDON: CPT

## 2024-05-28 PROCEDURE — 71045 X-RAY EXAM CHEST 1 VIEW: CPT

## 2024-05-28 PROCEDURE — 83605 ASSAY OF LACTIC ACID: CPT

## 2024-05-28 PROCEDURE — 81001 URINALYSIS AUTO W/SCOPE: CPT

## 2024-05-28 PROCEDURE — 96374 THER/PROPH/DIAG INJ IV PUSH: CPT

## 2024-05-28 PROCEDURE — 83690 ASSAY OF LIPASE: CPT

## 2024-05-28 PROCEDURE — 96376 TX/PRO/DX INJ SAME DRUG ADON: CPT

## 2024-05-28 RX ORDER — ONDANSETRON HYDROCHLORIDE 2 MG/ML
INJECTION, SOLUTION INTRAVENOUS
Status: COMPLETED
Start: 2024-05-28 | End: 2024-05-28

## 2024-05-28 RX ORDER — ONDANSETRON HYDROCHLORIDE 2 MG/ML
4 INJECTION, SOLUTION INTRAVENOUS ONCE
Status: COMPLETED | OUTPATIENT
Start: 2024-05-28 | End: 2024-05-28

## 2024-05-28 RX ORDER — MORPHINE SULFATE 4 MG/ML
4 INJECTION, SOLUTION INTRAMUSCULAR; INTRAVENOUS ONCE
Status: COMPLETED | OUTPATIENT
Start: 2024-05-28 | End: 2024-05-28

## 2024-05-28 RX ORDER — MORPHINE SULFATE 4 MG/ML
INJECTION, SOLUTION INTRAMUSCULAR; INTRAVENOUS
Status: COMPLETED
Start: 2024-05-28 | End: 2024-05-28

## 2024-05-28 RX ADMIN — HYDROMORPHONE HYDROCHLORIDE 0.5 MG: 1 INJECTION, SOLUTION INTRAMUSCULAR; INTRAVENOUS; SUBCUTANEOUS at 21:52

## 2024-05-28 RX ADMIN — ONDANSETRON HYDROCHLORIDE 4 MG: 2 INJECTION, SOLUTION INTRAVENOUS at 20:56

## 2024-05-28 RX ADMIN — IOHEXOL 75 ML: 350 INJECTION, SOLUTION INTRAVENOUS at 21:11

## 2024-05-28 RX ADMIN — ONDANSETRON 4 MG: 2 INJECTION INTRAMUSCULAR; INTRAVENOUS at 20:56

## 2024-05-28 RX ADMIN — SODIUM CHLORIDE, POTASSIUM CHLORIDE, SODIUM LACTATE AND CALCIUM CHLORIDE 1000 ML: 600; 310; 30; 20 INJECTION, SOLUTION INTRAVENOUS at 21:52

## 2024-05-28 RX ADMIN — MORPHINE SULFATE 4 MG: 4 INJECTION, SOLUTION INTRAMUSCULAR; INTRAVENOUS at 20:55

## 2024-05-28 ASSESSMENT — PAIN DESCRIPTION - PAIN TYPE
TYPE: ACUTE PAIN
TYPE: ACUTE PAIN

## 2024-05-28 ASSESSMENT — PAIN DESCRIPTION - FREQUENCY: FREQUENCY: CONSTANT/CONTINUOUS

## 2024-05-28 ASSESSMENT — PAIN SCALES - GENERAL
PAINLEVEL_OUTOF10: 8
PAINLEVEL_OUTOF10: 3

## 2024-05-28 ASSESSMENT — PAIN DESCRIPTION - LOCATION
LOCATION: ABDOMEN
LOCATION: ABDOMEN

## 2024-05-28 ASSESSMENT — PAIN - FUNCTIONAL ASSESSMENT
PAIN_FUNCTIONAL_ASSESSMENT: 0-10
PAIN_FUNCTIONAL_ASSESSMENT: 0-10

## 2024-05-28 ASSESSMENT — PAIN DESCRIPTION - ORIENTATION: ORIENTATION: RIGHT;UPPER

## 2024-05-28 ASSESSMENT — PAIN DESCRIPTION - DESCRIPTORS: DESCRIPTORS: ACHING

## 2024-05-28 NOTE — Clinical Note
Uriel Gomez was seen and treated in our emergency department on 5/28/2024.  He may return to work on 06/01/2024.  2-3 days if needed     If you have any questions or concerns, please don't hesitate to call.      Stephanie Arrington RN

## 2024-05-29 VITALS
TEMPERATURE: 98.3 F | RESPIRATION RATE: 18 BRPM | HEIGHT: 78 IN | DIASTOLIC BLOOD PRESSURE: 108 MMHG | WEIGHT: 315 LBS | OXYGEN SATURATION: 98 % | SYSTOLIC BLOOD PRESSURE: 175 MMHG | HEART RATE: 64 BPM | BODY MASS INDEX: 36.45 KG/M2

## 2024-05-29 LAB
ATRIAL RATE: 71 BPM
P AXIS: 38 DEGREES
P OFFSET: 208 MS
P ONSET: 152 MS
PR INTERVAL: 144 MS
Q ONSET: 224 MS
QRS COUNT: 12 BEATS
QRS DURATION: 108 MS
QT INTERVAL: 398 MS
QTC CALCULATION(BAZETT): 432 MS
QTC FREDERICIA: 421 MS
R AXIS: -27 DEGREES
T AXIS: 24 DEGREES
T OFFSET: 423 MS
VENTRICULAR RATE: 71 BPM

## 2024-05-29 PROCEDURE — 96361 HYDRATE IV INFUSION ADD-ON: CPT

## 2024-05-29 PROCEDURE — C9113 INJ PANTOPRAZOLE SODIUM, VIA: HCPCS | Performed by: EMERGENCY MEDICINE

## 2024-05-29 PROCEDURE — 96375 TX/PRO/DX INJ NEW DRUG ADDON: CPT

## 2024-05-29 PROCEDURE — 2500000004 HC RX 250 GENERAL PHARMACY W/ HCPCS (ALT 636 FOR OP/ED): Performed by: EMERGENCY MEDICINE

## 2024-05-29 RX ORDER — KETOROLAC TROMETHAMINE 15 MG/ML
15 INJECTION, SOLUTION INTRAMUSCULAR; INTRAVENOUS ONCE
Status: COMPLETED | OUTPATIENT
Start: 2024-05-29 | End: 2024-05-29

## 2024-05-29 RX ORDER — MORPHINE SULFATE 4 MG/ML
4 INJECTION, SOLUTION INTRAMUSCULAR; INTRAVENOUS ONCE
Status: COMPLETED | OUTPATIENT
Start: 2024-05-29 | End: 2024-05-29

## 2024-05-29 RX ORDER — PANTOPRAZOLE SODIUM 20 MG/1
20 TABLET, DELAYED RELEASE ORAL DAILY
Qty: 20 TABLET | Refills: 0 | Status: SHIPPED | OUTPATIENT
Start: 2024-05-29 | End: 2024-06-18

## 2024-05-29 RX ORDER — IBUPROFEN 600 MG/1
600 TABLET ORAL EVERY 8 HOURS PRN
Qty: 30 TABLET | Refills: 0 | Status: SHIPPED | OUTPATIENT
Start: 2024-05-29

## 2024-05-29 RX ORDER — PANTOPRAZOLE SODIUM 40 MG/10ML
40 INJECTION, POWDER, LYOPHILIZED, FOR SOLUTION INTRAVENOUS ONCE
Status: COMPLETED | OUTPATIENT
Start: 2024-05-29 | End: 2024-05-29

## 2024-05-29 RX ORDER — HYDROCODONE BITARTRATE AND ACETAMINOPHEN 5; 325 MG/1; MG/1
1 TABLET ORAL EVERY 4 HOURS PRN
Qty: 18 TABLET | Refills: 0 | Status: SHIPPED | OUTPATIENT
Start: 2024-05-29 | End: 2024-06-01

## 2024-05-29 RX ADMIN — SODIUM CHLORIDE 1000 ML: 9 INJECTION, SOLUTION INTRAVENOUS at 00:51

## 2024-05-29 RX ADMIN — PANTOPRAZOLE SODIUM 40 MG: 40 INJECTION, POWDER, LYOPHILIZED, FOR SOLUTION INTRAVENOUS at 00:51

## 2024-05-29 RX ADMIN — MORPHINE SULFATE 4 MG: 4 INJECTION, SOLUTION INTRAMUSCULAR; INTRAVENOUS at 00:51

## 2024-05-29 RX ADMIN — KETOROLAC TROMETHAMINE 15 MG: 15 INJECTION INTRAMUSCULAR; INTRAVENOUS at 00:51

## 2024-05-29 ASSESSMENT — PAIN - FUNCTIONAL ASSESSMENT: PAIN_FUNCTIONAL_ASSESSMENT: 0-10

## 2024-05-29 ASSESSMENT — PAIN SCALES - GENERAL: PAINLEVEL_OUTOF10: 0 - NO PAIN

## 2024-05-29 NOTE — ED PROVIDER NOTES
HPI   Chief Complaint   Patient presents with    Abdominal Pain     Right upper quadrant pain that started today       Patient is a 23-year-old male with no significant PMH presents to the ED with cc of right upper quadrant abdominal pain times this morning.  Patient states it is intermittent but now it has become more constant.  Patient describes a sharp stabbing.  Patient states pain is worse with sucking in.  Patient denies any history of abdominal surgeries.  Patient states this feels similar to when he was here couple weeks ago they told him he had gallstones.  Patient has not followed with a surgeon yet.  Patient has an appointment in 2 weeks.  Patient denies any injury to the area.  Patient denies any fever chills congestion cough pharyngitis vomiting diarrhea constipation.  Patient does endorse nausea.  Patient has been still tolerating p.o. intake but does not have much of an appetite.  Patient states pain is not worsened with food.  Patient denies any chest pain or shortness of breath.  Patient denies any dysuria or hematuria.  Patient denies any tobacco alcohol or street drug abuse                          No data recorded                   Patient History   No past medical history on file.  No past surgical history on file.  Family History   Problem Relation Name Age of Onset    Mental illness Mother      Hypertension Mother      Diabetes Mother      Cancer Mother      Hypertension Father       Social History     Tobacco Use    Smoking status: Never     Passive exposure: Never    Smokeless tobacco: Never   Vaping Use    Vaping status: Never Used   Substance Use Topics    Alcohol use: Never    Drug use: Never       Physical Exam   ED Triage Vitals [05/28/24 2022]   Temperature Heart Rate Respirations BP   36.8 °C (98.3 °F) 72 20 (!) 151/106      Pulse Ox Temp Source Heart Rate Source Patient Position   97 % Tympanic -- Sitting      BP Location FiO2 (%)     Left arm --       Physical Exam  Cardiovascular:       Rate and Rhythm: Normal rate and regular rhythm.      Heart sounds: Normal heart sounds.   Pulmonary:      Effort: Pulmonary effort is normal.      Breath sounds: Normal breath sounds. No wheezing, rhonchi or rales.   Abdominal:      Palpations: Abdomen is soft.      Tenderness: There is abdominal tenderness in the right upper quadrant. Positive signs include Sorensen's sign.   Neurological:      General: No focal deficit present.      Mental Status: He is alert and oriented to person, place, and time.         ED Course & MDM   ED Course as of 05/28/24 2153   Tue May 28, 2024   2038 EKG interpretation performed at 2029 normal sinus rhythm with sinus arrhythmia left axis deviation no acute signs of ischemia.  Ventricular rate 71 bpm []      ED Course User Index  [] Екатерина Watkins PA-C         Diagnoses as of 05/28/24 2153   Right upper quadrant abdominal pain       Medical Decision Making  Medical Decision Making:  Patient presented as described in HPI. Patient case including ROS, PE, and treatment and plan discussed with ED attending if attached as cosigner. Due to patients presentation orders completed include as documented.  Patient presents to the ED with cc of abdominal pain times this morning.  Patient states has become progressively worsened.  Patient also endorses nausea.  Patient denies any history of abdominal surgeries.  Patient states this feels similar to when he came here 3 weeks ago and found to have gallstones.  Patient is nontoxic-appearing abdomen is soft tender to the right upper quadrant with Sorensen sign positive lung sounds are clear bilaterally.  Patient had Tylenol and ibuprofen prior to arrival.  Patient given fluids Zofran and morphine.  Pending labs and imaging.  Patient is endorsing pain again and given Dilaudid.  Troponin lactate lipase CMP mag UA CBC all within normal limits.  X-ray of the chest shows mild pulmonary vascular congestion no evidence of acute cardiopulmonary  process.  Pending CT abdomen and pelvis and ultrasound of the gallbladder.  Patient's care continued by ER attending and will be dispo once results return.      This note has been transcribed using voice recognition and may contain grammatical errors, misplaced words, incorrect words, incorrect phrases or other errors.        Labs Reviewed   CBC WITH AUTO DIFFERENTIAL - Abnormal       Result Value    WBC 7.9      nRBC 0.0      RBC 4.38 (*)     Hemoglobin 13.2 (*)     Hematocrit 38.8 (*)     MCV 89      MCH 30.1      MCHC 34.0      RDW 13.4      Platelets 218      Neutrophils % 51.3      Immature Granulocytes %, Automated 0.3      Lymphocytes % 35.3      Monocytes % 10.6      Eosinophils % 2.0      Basophils % 0.5      Neutrophils Absolute 4.07      Immature Granulocytes Absolute, Automated 0.02      Lymphocytes Absolute 2.80      Monocytes Absolute 0.84      Eosinophils Absolute 0.16      Basophils Absolute 0.04     URINALYSIS MICROSCOPIC WITH REFLEX CULTURE - Abnormal    WBC, Urine NONE      RBC, Urine NONE      Bacteria, Urine 1+ (*)     Mucus, Urine FEW     COMPREHENSIVE METABOLIC PANEL - Normal    Glucose 93      Sodium 140      Potassium 3.9      Chloride 107      Bicarbonate 25      Anion Gap 12      Urea Nitrogen 13      Creatinine 0.78      eGFR >90      Calcium 9.6      Albumin 4.4      Alkaline Phosphatase 45      Total Protein 7.6      AST 21      Bilirubin, Total 0.5      ALT 44     MAGNESIUM - Normal    Magnesium 1.78     LIPASE - Normal    Lipase 33      Narrative:     Venipuncture immediately after or during the administration of Metamizole may lead to falsely low results. Testing should be performed immediately prior to Metamizole dosing.   URINALYSIS WITH REFLEX CULTURE AND MICROSCOPIC - Normal    Color, Urine Yellow      Appearance, Urine Clear      Specific Gravity, Urine 1.033      pH, Urine 6.0      Protein, Urine 10 (TRACE)      Glucose, Urine Normal      Blood, Urine NEGATIVE      Ketones,  Urine NEGATIVE      Bilirubin, Urine NEGATIVE      Urobilinogen, Urine Normal      Nitrite, Urine NEGATIVE      Leukocyte Esterase, Urine NEGATIVE     TROPONIN I, HIGH SENSITIVITY - Normal    Troponin I, High Sensitivity <3      Narrative:     Less than 99th percentile of normal range cutoff-  Female and children under 18 years old <14 ng/L; Male <21 ng/L: Negative  Repeat testing should be performed if clinically indicated.     Female and children under 18 years old 14-50 ng/L; Male 21-50 ng/L:  Consistent with possible cardiac damage and possible increased clinical   risk. Serial measurements may help to assess extent of myocardial damage.     >50 ng/L: Consistent with cardiac damage, increased clinical risk and  myocardial infarction. Serial measurements may help assess extent of   myocardial damage.      NOTE: Children less than 1 year old may have higher baseline troponin   levels and results should be interpreted in conjunction with the overall   clinical context.     NOTE: Troponin I testing is performed using a different   testing methodology at [facility] than at other   [facility]. Direct result comparisons should only   be made within the same method.   LACTATE - Normal    Lactate 1.1      Narrative:     Venipuncture immediately after or during the administration of Metamizole may lead to falsely low results. Testing should be performed immediately  prior to Metamizole dosing.   URINALYSIS WITH REFLEX CULTURE AND MICROSCOPIC    Narrative:     The following orders were created for panel order Urinalysis with Reflex Culture and Microscopic.  Procedure                               Abnormality         Status                     ---------                               -----------         ------                     Urinalysis with Reflex C...[[id number]]  Normal              Final result               Extra Urine Gray Tube[[id number]]                                                          Please view results for these tests on the individual orders.   EXTRA URINE GRAY TUBE      Procedure  Procedures     Екатерина Watkins PA-C  05/28/24 1012

## 2024-05-29 NOTE — DISCHARGE INSTRUCTIONS
Lab work is normal  CT imaging again demonstrates cholelithiasis/gallstones  Ultrasound imaging shows gallstones, no evidence of gallbladder wall thickening or obstruction.  Please follow gallbladder diet.  Avoid spicy fried fatty foods.  No pork.  Take medications only as directed.  Follow-up with primary care and follow-up with your surgeon in the next week if possible.  Return if acutely worsening or new worrisome symptoms.

## 2024-06-03 NOTE — PROGRESS NOTES
Subjective   Patient ID: Uriel Gomez is a 23 y.o. male who presents for gallbladder referral.  HPI  Patient was referred by Dr. Meneses.  Patient seen in the ED on 5/28/2024 for RUQ pain.  Patient had CT scans.  On my review of his recent CT scan he does have a small umbilical hernia.  I also reviewed the ultrasound results which are listed below.  Patient had normal CMP.  Normal CBC.  Patient Radha he had episodes of right upper quadrant pain.  1 episode mid May was after having breakfast.  It was quite severe.  He had nausea and vomiting with it.  Patient also has a dull ache in the right upper quadrant.  Patient is significant family history of cholecystectomies including his mother and grandmother.  Patient's weight has been stable.  Patient denies any periumbilical pain.    US GALLBLADDER;  5/28/2024 10:31 pm      INDICATION:  Signs/Symptoms:ruq pain.      COMPARISON:  None.      ACCESSION NUMBER(S):  [id number]      ORDERING CLINICIAN:  TJ ORTEGA      TECHNIQUE:  Multiple images of the right upper quadrant were obtained.  Gray  scale, color Doppler and spectral Doppler waveform analysis was  performed.      FINDINGS:  LIVER:  The liver measures 25.7 cm. Mild hepatic steatosis.          Multiple gallstones detected within the gallbladder. No sonographic  Sorensen sign although by report pain medication was administered. No  evidence of gallbladder wall thickening or pericholecystic fluid.          BILE DUCTS:  No evidence of intra or extrahepatic biliary dilatation is  identified; the common bile duct measures 0.32 cm.      PANCREAS:  Not well seen      RIGHT KIDNEY:  The right kidney measures 11.3 cm in length. The renal cortical  echogenicity and thickness are within normal limit.  No  hydronephrosis or renal calculi are seen.      IMPRESSION:  Gallstones. No overt stigmata of acute gallbladder disease although  administration of pain medication does somewhat limit assessment.      No biliary duct  dilatation. Features suggesting hepatic steatosis      CT ABDOMEN PELVIS W IV CONTRAST;  5/28/2024 9:10 pm      INDICATION:  Signs/Symptoms:ruq abd pain.      COMPARISON:  5/13/2024, 12/5/20017      ACCESSION NUMBER(S):  [id number]      ORDERING CLINICIAN:  TJ ORTEGA      TECHNIQUE:  Contiguous axial images of the abdomen and pelvis were obtained after  the intravenous administration of  contrast. Coronal and sagittal  reformatted images were obtained from the axial images.      FINDINGS:  No basilar airspace disease.  No pleural effusion.      There is hepatic steatosis. There is cholelithiasis. No significant  dilatation of the common bile duct.      The pancreas, spleen, and adrenal glands appear unremarkable.      Symmetric enhancement of the kidneys.  No hydronephrosis.      No evidence of bowel obstruction or acute appendicitis.      Urinary bladder is underdistended and not well evaluated.      There is mild asymmetric prominence of left seminal vesicles with  likely seminal vesicle cyst, is also seen on the 12/5/2017 CT  examination. There is a 14 mm area of hyperdensity within the left  seminal vesicles which may correspond to hemorrhagic or proteinaceous  content      No acute fracture of the lumbar spine.      IMPRESSION:  Hepatic steatosis and cholelithiasis. Given the patient's reported  right upper quadrant pain, ultrasound is recommended for further  evaluation to exclude acute gallbladder pathology.      Stable mild asymmetric prominence of left seminal vesicles with  likely seminal vesicle cysts, which is also seen on the 12/5/2017 CT  examination. There is a 14 mm area of hyperdensity in left seminal  vesicle which is stable in comparison to prior examination and may  relate to hemorrhagic or proteinaceous content.      CT ABDOMEN PELVIS W IV CONTRAST;  5/13/2024 9:45 pm      INDICATION:  Signs/Symptoms:pain.      COMPARISON:  None.      ACCESSION NUMBER(S):  [id number]      ORDERING  CLINICIAN:  TRANG OLIVAREZ      TECHNIQUE:  CT of the abdomen and pelvis was performed.  Standard contiguous  axial images were obtained at 3 mm slice thickness through the  abdomen and pelvis. Coronal and sagittal reconstructions at 3 mm  slice thickness were performed.      75 mL of Omnipaque 350 were administered intravenously without  immediate complication.      FINDINGS:  LOWER CHEST:  Increased amount of bibasilar subsegmental atelectasis of  indeterminate cause.      ABDOMEN:      LIVER:  The liver is enlarged measuring 24 cm in craniocaudal dimension  compared with 21 cm and demonstrates diffusely decreased attenuation  suggesting steatosis.      BILE DUCTS:  Normal caliber.      GALLBLADDER:  Nondistended containing gallstones. No evident wall thickening or  surrounding inflammatory change.      PANCREAS:  The pancreas appears unremarkable without evidence of ductal  dilatation or masses.      SPLEEN:  Enlarged measuring 16 cm craniocaudal diameter. Similar adjacent  splenules.      ADRENAL GLANDS:  Within normal limits.      KIDNEYS AND URETERS:  The kidneys are normal in size and enhance symmetrically.  No  hydroureteronephrosis or nephroureterolithiasis is identified.      PELVIS:      BLADDER:  The urinary bladder is decompressed, limited for evaluation.      REPRODUCTIVE ORGANS:  The prostate is not enlarged. Similar appearance seminal vesicles.      BOWEL:  The stomach and bowel are normal caliber without wall thickening,  pneumatosis, or surrounding inflammatory change. Normal caliber  appendix.      VESSELS:  The aorta and IVC appear normal.      PERITONEUM/RETROPERITONEUM/LYMPH NODES:  No ascites or free air, no fluid collection. Pre-existing prominent  hepatic and portal caval region nodes and external iliac nodes are  similar including 13 mm short axis portal caval node image 67  compared with 13 mm and 12 mm short axis right external iliac node  image 175 compared with 12 mm. Unremarkable  retroperitoneum.      BONES AND ABDOMINAL WALL:  Unremarkable.      IMPRESSION:  1.  Progressive hepatomegaly with evidence of steatosis.  2. Cholelithiasis.  3. Similar mild abdominal and pelvic lymphadenopathy.  4. Similar splenomegaly.    Social History:  Tobacco Use - NEVER  Do you use any recreational drugs - NEVER  Alcohol Use - NEVER  Caffeine Intake - 1-2 sodas daily  Working - FULL TIME  Marital status - SINGLE  Living with - mom,sisters,grandfather    Past Medical History:   Diagnosis Date    Asthma ([facility])     Cholelithiasis     Sleep apnea      Past Surgical History:   Procedure Laterality Date    ADENOIDECTOMY      TONSILLECTOMY       Family History   Problem Relation Name Age of Onset    Mental illness Mother      Hypertension Mother      Diabetes Mother      Cancer Mother      Hypertension Father       No Known Allergies    Review of Systems   Constitutional:  Negative for appetite change, fever and unexpected weight change.   HENT:  Negative for congestion and trouble swallowing.    Respiratory:  Negative for cough and shortness of breath.    Cardiovascular:  Negative for chest pain and palpitations.   Gastrointestinal:  Positive for abdominal pain. Negative for diarrhea and nausea.   Genitourinary:  Negative for difficulty urinating and dysuria.   Musculoskeletal:  Negative for back pain and gait problem.   Skin:  Negative for color change and rash.   Neurological:  Negative for dizziness, speech difficulty and headaches.   Hematological:  Does not bruise/bleed easily.   Psychiatric/Behavioral:  Negative for confusion.        Objective   Physical Exam  There were no vitals taken for this visit.   GENERAL APPEARANCE: Patient appears in no acute distress.   EYES: Sclera non-icteric, PERRLA.   ENT Normal appearance of ears and nose.   NECK/THYROID: Neck: no masses. Thyroid: no masses.   LYMPH NODES: No cervical or supraclavicular lymphadenopathy.   CARDIOVASCULAR Heart: RRR, no murmurs; Carotid  bruits: none; Peripheral edema: none.   RESPIRATORY: Lungs: clear to auscultation bilaterally; no respiratory distress.   GI (ABDOMEN) No intraabdominal mass appreciated, no hepatosplenomegaly; Hernia: Patient with very small umbilical hernia barely appreciable on exam.  Nontender.; Tenderness: none.   PSYCH: Patient oriented to time, place and person, normal affect.    Assessment/Plan   Problem List Items Addressed This Visit             ICD-10-CM    Calculus of gallbladder with chronic cholecystitis without obstruction - Primary K80.10     Patient with signs and symptoms of gallbladder disease.  Recommend laparoscopic cholecystectomy.  Risk benefits alternatives of doing the surgery were thoroughly discussed with the patient agrees to proceed. Recommend avoiding ice-cream and other heavy creams, greasy or fried foods, and heavy sauces (i.e. spaghetti, jose, pizza).  Patient to call or go to ER if develops jaundice or with intractable N/V or pain.          Umbilical hernia with obstruction, without gangrene K42.0     Patient with a small incarcerated vocal hernia seen on CT of the abdomen pelvis.  Patient made aware that we will place our umbilical trocar through this hernia site and close it primarily on the way out.  Risk benefits alternatives of doing primary repair of umbiliclalhernia were discussed with patient who agrees to proceed.                  Claudia Lee MD 06/13/24 12:13 PM

## 2024-06-13 ENCOUNTER — OFFICE VISIT (OUTPATIENT)
Dept: SURGERY | Facility: CLINIC | Age: 24
End: 2024-06-13
Payer: COMMERCIAL

## 2024-06-13 VITALS
HEART RATE: 89 BPM | HEIGHT: 78 IN | BODY MASS INDEX: 36.45 KG/M2 | TEMPERATURE: 97.7 F | OXYGEN SATURATION: 98 % | WEIGHT: 315 LBS | SYSTOLIC BLOOD PRESSURE: 136 MMHG | RESPIRATION RATE: 17 BRPM | DIASTOLIC BLOOD PRESSURE: 85 MMHG

## 2024-06-13 DIAGNOSIS — K42.0 UMBILICAL HERNIA WITH OBSTRUCTION, WITHOUT GANGRENE: ICD-10-CM

## 2024-06-13 DIAGNOSIS — K80.20 CALCULUS OF GALLBLADDER WITHOUT CHOLECYSTITIS WITHOUT OBSTRUCTION: ICD-10-CM

## 2024-06-13 DIAGNOSIS — K80.10 CALCULUS OF GALLBLADDER WITH CHRONIC CHOLECYSTITIS WITHOUT OBSTRUCTION: Primary | ICD-10-CM

## 2024-06-13 PROCEDURE — 1036F TOBACCO NON-USER: CPT | Performed by: SURGERY

## 2024-06-13 PROCEDURE — 99204 OFFICE O/P NEW MOD 45 MIN: CPT | Performed by: SURGERY

## 2024-06-13 PROCEDURE — 99214 OFFICE O/P EST MOD 30 MIN: CPT | Performed by: SURGERY

## 2024-06-13 ASSESSMENT — LIFESTYLE VARIABLES
HOW MANY STANDARD DRINKS CONTAINING ALCOHOL DO YOU HAVE ON A TYPICAL DAY: PATIENT DOES NOT DRINK
SKIP TO QUESTIONS 9-10: 1
HOW OFTEN DO YOU HAVE A DRINK CONTAINING ALCOHOL: NEVER
AUDIT-C TOTAL SCORE: 0
HOW OFTEN DO YOU HAVE SIX OR MORE DRINKS ON ONE OCCASION: NEVER

## 2024-06-13 ASSESSMENT — ENCOUNTER SYMPTOMS
BACK PAIN: 0
DYSURIA: 0
APPETITE CHANGE: 0
DIFFICULTY URINATING: 0
DIZZINESS: 0
SHORTNESS OF BREATH: 0
ABDOMINAL PAIN: 1
HEADACHES: 0
PALPITATIONS: 0
UNEXPECTED WEIGHT CHANGE: 0
LOSS OF SENSATION IN FEET: 0
COUGH: 0
DIARRHEA: 0
DEPRESSION: 0
TROUBLE SWALLOWING: 0
FEVER: 0
NAUSEA: 0
OCCASIONAL FEELINGS OF UNSTEADINESS: 0
CONFUSION: 0
COLOR CHANGE: 0
SPEECH DIFFICULTY: 0
BRUISES/BLEEDS EASILY: 0

## 2024-06-13 ASSESSMENT — COLUMBIA-SUICIDE SEVERITY RATING SCALE - C-SSRS
6. HAVE YOU EVER DONE ANYTHING, STARTED TO DO ANYTHING, OR PREPARED TO DO ANYTHING TO END YOUR LIFE?: NO
1. IN THE PAST MONTH, HAVE YOU WISHED YOU WERE DEAD OR WISHED YOU COULD GO TO SLEEP AND NOT WAKE UP?: NO
2. HAVE YOU ACTUALLY HAD ANY THOUGHTS OF KILLING YOURSELF?: NO

## 2024-06-13 ASSESSMENT — PATIENT HEALTH QUESTIONNAIRE - PHQ9
1. LITTLE INTEREST OR PLEASURE IN DOING THINGS: NOT AT ALL
SUM OF ALL RESPONSES TO PHQ9 QUESTIONS 1 & 2: 0
2. FEELING DOWN, DEPRESSED OR HOPELESS: NOT AT ALL

## 2024-06-13 ASSESSMENT — PAIN SCALES - GENERAL: PAINLEVEL: 0-NO PAIN

## 2024-06-13 NOTE — LETTER
June 13, 2024     Patient: Uriel Gomez   YOB: 2000   Date of Visit: 6/13/2024       To Whom It May Concern:    It is my medical opinion that Uriel Gomez  was seen in my office on 6/13/2024 and is scheduled for surgery on 7/10/2024.  Following 7/10/2024 surgery, patient will be out of work until 7/25/2024 pending post-op appointment .    If you have any questions or concerns, please don't hesitate to call the office at [phone].         Sincerely,        Claudia Lee MD    CC: No Recipients

## 2024-06-27 ENCOUNTER — APPOINTMENT (OUTPATIENT)
Dept: PRIMARY CARE | Facility: CLINIC | Age: 24
End: 2024-06-27
Payer: COMMERCIAL

## 2024-07-01 ENCOUNTER — PRE-ADMISSION TESTING (OUTPATIENT)
Dept: PREADMISSION TESTING | Facility: HOSPITAL | Age: 24
End: 2024-07-01
Payer: COMMERCIAL

## 2024-07-01 VITALS
BODY MASS INDEX: 36.45 KG/M2 | WEIGHT: 315 LBS | DIASTOLIC BLOOD PRESSURE: 76 MMHG | OXYGEN SATURATION: 99 % | SYSTOLIC BLOOD PRESSURE: 136 MMHG | TEMPERATURE: 97.2 F | HEIGHT: 78 IN | HEART RATE: 78 BPM | RESPIRATION RATE: 19 BRPM

## 2024-07-01 PROCEDURE — 99203 OFFICE O/P NEW LOW 30 MIN: CPT | Performed by: PHYSICIAN ASSISTANT

## 2024-07-01 RX ORDER — HYDROCODONE BITARTRATE AND ACETAMINOPHEN 5; 325 MG/1; MG/1
1 TABLET ORAL EVERY 6 HOURS PRN
COMMUNITY

## 2024-07-01 ASSESSMENT — DUKE ACTIVITY SCORE INDEX (DASI)
CAN YOU DO YARD WORK LIKE RAKING LEAVES, WEEDING OR PUSHING A MOWER: YES
CAN YOU PARTICIPATE IN MODERATE RECREATIONAL ACTIVITIES LIKE GOLF, BOWLING, DANCING, DOUBLES TENNIS OR THROWING A BASEBALL OR FOOTBALL: YES
TOTAL_SCORE: 58.2
CAN YOU WALK INDOORS, SUCH AS AROUND YOUR HOUSE: YES
CAN YOU RUN A SHORT DISTANCE: YES
CAN YOU TAKE CARE OF YOURSELF (EAT, DRESS, BATHE, OR USE TOILET): YES
CAN YOU DO MODERATE WORK AROUND THE HOUSE LIKE VACUUMING, SWEEPING FLOORS OR CARRYING GROCERIES: YES
CAN YOU PARTICIPATE IN STRENOUS SPORTS LIKE SWIMMING, SINGLES TENNIS, FOOTBALL, BASKETBALL, OR SKIING: YES
CAN YOU CLIMB A FLIGHT OF STAIRS OR WALK UP A HILL: YES
CAN YOU HAVE SEXUAL RELATIONS: YES
CAN YOU DO HEAVY WORK AROUND THE HOUSE LIKE SCRUBBING FLOORS OR LIFTING AND MOVING HEAVY FURNITURE: YES
DASI METS SCORE: 9.9
CAN YOU WALK A BLOCK OR TWO ON LEVEL GROUND: YES
CAN YOU DO LIGHT WORK AROUND THE HOUSE LIKE DUSTING OR WASHING DISHES: YES

## 2024-07-01 ASSESSMENT — ENCOUNTER SYMPTOMS
CARDIOVASCULAR NEGATIVE: 1
EYE PAIN: 1
CONSTITUTIONAL NEGATIVE: 1
SHORTNESS OF BREATH: 1
ROS GI COMMENTS: SEE HPI
NEUROLOGICAL NEGATIVE: 1
ARTHRALGIAS: 1
PSYCHIATRIC NEGATIVE: 1

## 2024-07-01 NOTE — PREPROCEDURE INSTRUCTIONS
PAT DISCHARGE INSTRUCTIONS    Please call the Same Day Surgery (SDS) Department of the hospital where your procedure will be performed after 2:00 PM the day before your surgery. If you are scheduled on a Monday, or a Tuesday following a Monday holiday, you will need to call on the last business day prior to your surgery.    [facility]  [address], [address]  [phone]    [facility]  [address] [place] [address] [phone]    [facility]  [address]  [place] [id number]  [phone]    Please let your surgeon know if:      You develop any open sores, shingles, burning or painful urination as these may increase your risk of an infection.   You no longer wish to have the surgery.   Any other personal circumstances change that may lead to the need to cancel or defer this surgery-such as being sick or getting admitted to any hospital within one week of your planned procedure.    Your contact details change, such as a change of address or phone number.    Starting now:     Please DO NOT drink alcohol or smoke for 24 hours before surgery. It is well known that quitting smoking can make a huge difference to your health and recovery from surgery. The longer you abstain from smoking, the better your chances of a healthy recovery. If you need help with quitting, call 8-800-QUIT-NOW to be connected to a trained counselor who will discuss the best methods to help you quit.     Before your surgery:    Please stop all supplements 7 days prior to surgery. Or as directed by your surgeon.   Please stop taking NSAID pain medicine such as Advil and Motrin 7 days before surgery.    If you develop any fever, cough, cold, rashes, cuts, scratches, scrapes, urinary symptoms or infection anywhere on your body (including teeth and gums) prior to surgery, please call your surgeon’s office as soon as  possible. This may require treatment to reduce the chance of cancellation on the day of surgery.    The day before your surgery:   Get a good night’s rest.  Use the special soap for bathing if you have been instructed to use one.    Scheduled surgery times may change and you will be notified if this occurs - please check your personal voicemail for any updates.     On the morning of surgery:   Wear comfortable, loose fitting clothes which open in the front. Please do not wear moisturizers, creams, lotions, makeup or perfume.    Please bring with you to surgery:   Photo ID and insurance card   Current list of medicines and allergies   Pacemaker/ Defibrillator/Heart stent cards   CPAP machine and mask    Slings/ splints/ crutches   A copy of your complete advanced directive/DHPOA.    Please do NOT bring with you to surgery:   All jewelry and valuables should be left at home.   Prosthetic devices such as contact lenses, hearing aids, dentures, eyelash extensions, hairpins and body piercings must be removed prior to going in to the surgical suite.    After outpatient surgery:   A responsible adult MUST accompany you at the time of discharge and stay with you for 24 hours after your surgery. You may NOT drive yourself home after surgery.    Do not drive, operate machinery, make critical decisions or do activities that require co-ordination or balance until after a night’s sleep.   Do not drink alcoholic beverages for 24 hours.   Instructions for resuming your medications will be provided by your surgeon.    CALL YOUR DOCTOR AFTER SURGERY IF YOU HAVE:     Chills and/or a fever of 101° F or higher.    Redness, swelling, pus or drainage from your surgical wound or a bad smell from the wound.    Lightheadedness, fainting or confusion.    Persistent vomiting (throwing up) and are not able to eat or drink for 12 hours.    Three or more loose, watery bowel movements in 24 hours (diarrhea).   Difficulty or pain while urinating(  after non-urological surgery)    Pain and swelling in your legs, especially if it is only on one side.    Difficulty breathing or are breathing faster than normal.    Any new concerning symptoms.          Preoperative Fasting Guidelines    Why must I stop eating and drinking near surgery time?  With sedation, food or liquid in your stomach can enter your lungs causing serious complications  Increases nausea and vomiting    When do I need to stop eating and drinking before my surgery?  Do not eat any food after midnight the night before your surgery/procedure.  You may have up to 13 ounces of clear liquid until TWO hours before your instructed arrival time to the hospital.  This includes water, black tea/coffee, (no milk or cream) apple juice, and electrolyte drinks (Gatorade)  You may chew gum until TWO hours before your surgery/procedure     Medication List            Accurate as of July 1, 2024  7:12 AM. Always use your most recent med list.                albuterol 90 mcg/actuation inhaler  Medication Adjustments for Surgery: Other (Comment)  Notes to patient: CONTINUE IF NEEDED     HYDROcodone-acetaminophen 5-325 mg tablet  Commonly known as: Norco  Medication Adjustments for Surgery: Other (Comment)  Notes to patient: CONTINUE IF NEEDED     ibuprofen 600 mg tablet  Take 1 tablet (600 mg) by mouth every 8 hours if needed for mild pain (1 - 3) or fever (temp greater than 38.0 C).  Medication Adjustments for Surgery: Stop 7 days before surgery     pantoprazole 20 mg EC tablet  Commonly known as: ProtoNix  Take 1 tablet (20 mg) by mouth once daily for 20 days. Do not crush, chew, or split.  Medication Adjustments for Surgery: Take morning of surgery with sip of water, no other fluids

## 2024-07-01 NOTE — CPM/PAT H&P
"CPM/PAT Evaluation       Name: Uriel Gomez (Uriel Gomez)  /Age: 2000/ y.o.     In-Person       Chief Complaint: \"abdominal pain\"    HPI  The patient is a 24 year old male.  In May, 2024 he developed non-radiating right upper quadrant abdominal pain, nausea, vomiting, cramping and diarrhea.  This was not related to eating.  He presented to [facility] for evaluation and a CT of the abdomen and pelvis showed gallstones.  He was instructed to follow up with a surgeon.  Two weeks later he had a repeat episode as above after eating breakfast, but the pain was severe.  He returned to the ED and again was instructed to follow up with a surgeon.  He was seen by Dr. Lee and a laparoscopic cholecystectomy is recommended at this time.    Past Medical History:   Diagnosis Date    Asthma ([facility])     Cholelithiasis     GERD (gastroesophageal reflux disease)     H/O pericarditis     Obesity     Sleep apnea     Vitiligo        Past Surgical History:   Procedure Laterality Date    ADENOIDECTOMY      TONSILLECTOMY       Family History   Problem Relation Name Age of Onset    Mental illness Mother      Hypertension Mother      Diabetes Mother      Cancer Mother      Hypertension Father       Social History     Tobacco Use    Smoking status: Never     Passive exposure: Never    Smokeless tobacco: Never   Substance Use Topics    Alcohol use: Never     Social History     Substance and Sexual Activity   Drug Use Never     No Known Allergies    Current Outpatient Medications   Medication Sig Dispense Refill    albuterol 90 mcg/actuation inhaler Inhale 2 puffs every 6 hours if needed.      HYDROcodone-acetaminophen (Norco) 5-325 mg tablet Take 1 tablet by mouth every 6 hours if needed for severe pain (7 - 10).      ibuprofen 600 mg tablet Take 1 tablet (600 mg) by mouth every 8 hours if needed for mild pain (1 - 3) or fever (temp greater than 38.0 C). 30 tablet 0     No current facility-administered medications for " "this visit.     Review of Systems   Constitutional: Negative.    HENT: Negative.     Eyes:  Positive for pain (occasional).   Respiratory:  Positive for shortness of breath.    Cardiovascular: Negative.    Gastrointestinal:         See HPI   Genitourinary: Negative.    Musculoskeletal:  Positive for arthralgias.   Neurological: Negative.    Psychiatric/Behavioral: Negative.       /76   Pulse 78   Temp 36.2 °C (97.2 °F) (Temporal)   Resp 19   Ht 2.007 m (6' 7\")   Wt (!) 163 kg (359 lb 9.1 oz)   SpO2 99%   BMI 40.51 kg/m²     Physical Exam  Vitals reviewed.   Constitutional:       Comments: Overweight     HENT:      Head: Normocephalic and atraumatic.      Mouth/Throat:      Mouth: Mucous membranes are moist.      Pharynx: Oropharynx is clear.   Eyes:      Extraocular Movements: Extraocular movements intact.      Pupils: Pupils are equal, round, and reactive to light.   Cardiovascular:      Rate and Rhythm: Normal rate and regular rhythm.      Heart sounds: Normal heart sounds.   Pulmonary:      Breath sounds: Normal breath sounds.   Abdominal:      General: Bowel sounds are normal.      Palpations: Abdomen is soft.   Musculoskeletal:         General: No swelling.   Skin:     General: Skin is warm and dry.      Comments: Vitiligo noted bilateral hands and face.   Neurological:      General: No focal deficit present.      Mental Status: He is alert and oriented to person, place, and time.   Psychiatric:         Mood and Affect: Mood normal.         Behavior: Behavior normal.        PAT AIRWAY:   Airway:     Mallampati::  IV    TM distance::  >3 FB    Neck ROM::  Full   Teeth intact    ASA:  II  DASI RISK SCORE:  58.2  METS SCORE:  9.9  CHAD2 SCORE:  1.9%  REVISED CARDIAC RISK INDEX:  0.4%  STOP BANG SCORE:  5    EKG 5/28/2024 - NSR, CANNOT R/O INTERIOR INFARCT  CBC, CMP  5/28/2024    Assessment and Plan:     Calculus of gallbladder with chronic cholecystitis without obstruction, umbilical hernia with " obstruction, without gangrene:  Laparoscopic cholecystectomy  ALAINA - compliant with CPAP  Abnormal EKG - requesting cardiac clearance  H/O Pericarditis 2012 - associated with pneumonia, no recurrence  Obesity - BM:  40.51  Asthma - well controlled with rescue inhaler as needed    Jennifer South PA-C

## 2024-07-02 ENCOUNTER — TELEPHONE (OUTPATIENT)
Dept: PRIMARY CARE | Facility: CLINIC | Age: 24
End: 2024-07-02
Payer: COMMERCIAL

## 2024-07-02 DIAGNOSIS — R94.31 ABNORMAL EKG: ICD-10-CM

## 2024-07-11 ENCOUNTER — APPOINTMENT (OUTPATIENT)
Dept: RADIOLOGY | Facility: HOSPITAL | Age: 24
End: 2024-07-11
Payer: COMMERCIAL

## 2024-07-11 ENCOUNTER — HOSPITAL ENCOUNTER (EMERGENCY)
Facility: HOSPITAL | Age: 24
Discharge: HOME | End: 2024-07-11
Attending: EMERGENCY MEDICINE
Payer: COMMERCIAL

## 2024-07-11 VITALS
RESPIRATION RATE: 16 BRPM | TEMPERATURE: 97.5 F | OXYGEN SATURATION: 98 % | SYSTOLIC BLOOD PRESSURE: 115 MMHG | WEIGHT: 315 LBS | HEIGHT: 78 IN | BODY MASS INDEX: 36.45 KG/M2 | DIASTOLIC BLOOD PRESSURE: 56 MMHG | HEART RATE: 56 BPM

## 2024-07-11 DIAGNOSIS — K80.12 CALCULUS OF GALLBLADDER WITH ACUTE ON CHRONIC CHOLECYSTITIS WITHOUT OBSTRUCTION: Primary | ICD-10-CM

## 2024-07-11 LAB
ALBUMIN SERPL BCP-MCNC: 4.4 G/DL (ref 3.4–5)
ALP SERPL-CCNC: 50 U/L (ref 33–120)
ALT SERPL W P-5'-P-CCNC: 46 U/L (ref 10–52)
ANION GAP SERPL CALC-SCNC: 14 MMOL/L (ref 10–20)
AST SERPL W P-5'-P-CCNC: 23 U/L (ref 9–39)
BASOPHILS # BLD AUTO: 0.04 X10*3/UL (ref 0–0.1)
BASOPHILS NFR BLD AUTO: 0.5 %
BILIRUB SERPL-MCNC: 0.6 MG/DL (ref 0–1.2)
BUN SERPL-MCNC: 11 MG/DL (ref 6–23)
CALCIUM SERPL-MCNC: 9.7 MG/DL (ref 8.6–10.3)
CHLORIDE SERPL-SCNC: 106 MMOL/L (ref 98–107)
CO2 SERPL-SCNC: 25 MMOL/L (ref 21–32)
CREAT SERPL-MCNC: 0.81 MG/DL (ref 0.5–1.3)
EGFRCR SERPLBLD CKD-EPI 2021: >90 ML/MIN/1.73M*2
EOSINOPHIL # BLD AUTO: 0.09 X10*3/UL (ref 0–0.7)
EOSINOPHIL NFR BLD AUTO: 1.2 %
ERYTHROCYTE [DISTWIDTH] IN BLOOD BY AUTOMATED COUNT: 13.4 % (ref 11.5–14.5)
GLUCOSE SERPL-MCNC: 123 MG/DL (ref 74–99)
HCT VFR BLD AUTO: 38.6 % (ref 41–52)
HGB BLD-MCNC: 13.2 G/DL (ref 13.5–17.5)
IMM GRANULOCYTES # BLD AUTO: 0.03 X10*3/UL (ref 0–0.7)
IMM GRANULOCYTES NFR BLD AUTO: 0.4 % (ref 0–0.9)
LACTATE SERPL-SCNC: 2 MMOL/L (ref 0.4–2)
LIPASE SERPL-CCNC: 31 U/L (ref 9–82)
LYMPHOCYTES # BLD AUTO: 2.42 X10*3/UL (ref 1.2–4.8)
LYMPHOCYTES NFR BLD AUTO: 32.1 %
MCH RBC QN AUTO: 30.2 PG (ref 26–34)
MCHC RBC AUTO-ENTMCNC: 34.2 G/DL (ref 32–36)
MCV RBC AUTO: 88 FL (ref 80–100)
MONOCYTES # BLD AUTO: 0.56 X10*3/UL (ref 0.1–1)
MONOCYTES NFR BLD AUTO: 7.4 %
NEUTROPHILS # BLD AUTO: 4.41 X10*3/UL (ref 1.2–7.7)
NEUTROPHILS NFR BLD AUTO: 58.4 %
NRBC BLD-RTO: 0 /100 WBCS (ref 0–0)
PLATELET # BLD AUTO: 235 X10*3/UL (ref 150–450)
POTASSIUM SERPL-SCNC: 4.4 MMOL/L (ref 3.5–5.3)
PROT SERPL-MCNC: 7.6 G/DL (ref 6.4–8.2)
RBC # BLD AUTO: 4.37 X10*6/UL (ref 4.5–5.9)
SODIUM SERPL-SCNC: 141 MMOL/L (ref 136–145)
WBC # BLD AUTO: 7.6 X10*3/UL (ref 4.4–11.3)

## 2024-07-11 PROCEDURE — C9113 INJ PANTOPRAZOLE SODIUM, VIA: HCPCS | Performed by: EMERGENCY MEDICINE

## 2024-07-11 PROCEDURE — 80053 COMPREHEN METABOLIC PANEL: CPT | Performed by: EMERGENCY MEDICINE

## 2024-07-11 PROCEDURE — 2500000004 HC RX 250 GENERAL PHARMACY W/ HCPCS (ALT 636 FOR OP/ED)

## 2024-07-11 PROCEDURE — 85025 COMPLETE CBC W/AUTO DIFF WBC: CPT | Performed by: EMERGENCY MEDICINE

## 2024-07-11 PROCEDURE — 96375 TX/PRO/DX INJ NEW DRUG ADDON: CPT

## 2024-07-11 PROCEDURE — 76705 ECHO EXAM OF ABDOMEN: CPT

## 2024-07-11 PROCEDURE — 96374 THER/PROPH/DIAG INJ IV PUSH: CPT

## 2024-07-11 PROCEDURE — 76705 ECHO EXAM OF ABDOMEN: CPT | Performed by: RADIOLOGY

## 2024-07-11 PROCEDURE — 83690 ASSAY OF LIPASE: CPT | Performed by: EMERGENCY MEDICINE

## 2024-07-11 PROCEDURE — 2500000005 HC RX 250 GENERAL PHARMACY W/O HCPCS: Performed by: EMERGENCY MEDICINE

## 2024-07-11 PROCEDURE — 2500000004 HC RX 250 GENERAL PHARMACY W/ HCPCS (ALT 636 FOR OP/ED): Performed by: EMERGENCY MEDICINE

## 2024-07-11 PROCEDURE — 96376 TX/PRO/DX INJ SAME DRUG ADON: CPT

## 2024-07-11 PROCEDURE — 36415 COLL VENOUS BLD VENIPUNCTURE: CPT | Performed by: EMERGENCY MEDICINE

## 2024-07-11 PROCEDURE — 99285 EMERGENCY DEPT VISIT HI MDM: CPT | Mod: 25

## 2024-07-11 PROCEDURE — 96361 HYDRATE IV INFUSION ADD-ON: CPT

## 2024-07-11 PROCEDURE — 83605 ASSAY OF LACTIC ACID: CPT | Performed by: EMERGENCY MEDICINE

## 2024-07-11 RX ORDER — ONDANSETRON 4 MG/1
4 TABLET, ORALLY DISINTEGRATING ORAL EVERY 8 HOURS PRN
Qty: 15 TABLET | Refills: 0 | Status: SHIPPED | OUTPATIENT
Start: 2024-07-11

## 2024-07-11 RX ORDER — FENTANYL CITRATE 50 UG/ML
INJECTION, SOLUTION INTRAMUSCULAR; INTRAVENOUS
Status: COMPLETED
Start: 2024-07-11 | End: 2024-07-11

## 2024-07-11 RX ORDER — ONDANSETRON HYDROCHLORIDE 2 MG/ML
INJECTION, SOLUTION INTRAVENOUS
Status: COMPLETED
Start: 2024-07-11 | End: 2024-07-11

## 2024-07-11 RX ORDER — FENTANYL CITRATE 50 UG/ML
50 INJECTION, SOLUTION INTRAMUSCULAR; INTRAVENOUS EVERY 2 HOUR PRN
Status: DISCONTINUED | OUTPATIENT
Start: 2024-07-11 | End: 2024-07-11 | Stop reason: HOSPADM

## 2024-07-11 RX ORDER — ONDANSETRON HYDROCHLORIDE 2 MG/ML
4 INJECTION, SOLUTION INTRAVENOUS ONCE
Status: COMPLETED | OUTPATIENT
Start: 2024-07-11 | End: 2024-07-11

## 2024-07-11 RX ORDER — PANTOPRAZOLE SODIUM 40 MG/10ML
40 INJECTION, POWDER, LYOPHILIZED, FOR SOLUTION INTRAVENOUS ONCE
Status: COMPLETED | OUTPATIENT
Start: 2024-07-11 | End: 2024-07-11

## 2024-07-11 RX ORDER — PANTOPRAZOLE SODIUM 40 MG/1
40 TABLET, DELAYED RELEASE ORAL
Qty: 30 TABLET | Refills: 0 | Status: SHIPPED | OUTPATIENT
Start: 2024-07-11 | End: 2025-07-11

## 2024-07-11 RX ORDER — HYDROCODONE BITARTRATE AND ACETAMINOPHEN 5; 325 MG/1; MG/1
1 TABLET ORAL EVERY 6 HOURS PRN
Qty: 12 TABLET | Refills: 0 | Status: SHIPPED | OUTPATIENT
Start: 2024-07-11 | End: 2024-07-14

## 2024-07-11 RX ORDER — SODIUM CHLORIDE 9 MG/ML
125 INJECTION, SOLUTION INTRAVENOUS CONTINUOUS
Status: DISCONTINUED | OUTPATIENT
Start: 2024-07-11 | End: 2024-07-11 | Stop reason: HOSPADM

## 2024-07-11 RX ADMIN — ONDANSETRON HYDROCHLORIDE 4 MG: 2 INJECTION, SOLUTION INTRAVENOUS at 08:11

## 2024-07-11 RX ADMIN — ONDANSETRON 4 MG: 2 INJECTION INTRAMUSCULAR; INTRAVENOUS at 08:11

## 2024-07-11 RX ADMIN — SODIUM CHLORIDE 3260 ML: 9 INJECTION, SOLUTION INTRAVENOUS at 08:09

## 2024-07-11 RX ADMIN — FENTANYL CITRATE 50 MCG: 0.05 INJECTION, SOLUTION INTRAMUSCULAR; INTRAVENOUS at 08:11

## 2024-07-11 RX ADMIN — FENTANYL CITRATE 50 MCG: 0.05 INJECTION, SOLUTION INTRAMUSCULAR; INTRAVENOUS at 09:25

## 2024-07-11 RX ADMIN — PANTOPRAZOLE SODIUM 40 MG: 40 INJECTION, POWDER, LYOPHILIZED, FOR SOLUTION INTRAVENOUS at 08:12

## 2024-07-11 RX ADMIN — Medication 2 L/MIN: at 08:33

## 2024-07-11 RX ADMIN — SODIUM CHLORIDE 125 ML/HR: 9 INJECTION, SOLUTION INTRAVENOUS at 09:26

## 2024-07-11 ASSESSMENT — PAIN DESCRIPTION - DESCRIPTORS
DESCRIPTORS: ACHING;BURNING;CRAMPING
DESCRIPTORS: BURNING;CRAMPING

## 2024-07-11 ASSESSMENT — PAIN DESCRIPTION - ORIENTATION: ORIENTATION: RIGHT

## 2024-07-11 ASSESSMENT — PAIN SCALES - GENERAL
PAINLEVEL_OUTOF10: 3
PAINLEVEL_OUTOF10: 9
PAINLEVEL_OUTOF10: 5 - MODERATE PAIN

## 2024-07-11 ASSESSMENT — PAIN DESCRIPTION - LOCATION: LOCATION: ABDOMEN

## 2024-07-11 ASSESSMENT — PAIN DESCRIPTION - FREQUENCY: FREQUENCY: CONSTANT/CONTINUOUS

## 2024-07-11 ASSESSMENT — PAIN - FUNCTIONAL ASSESSMENT: PAIN_FUNCTIONAL_ASSESSMENT: 0-10

## 2024-07-11 NOTE — ED TRIAGE NOTES
Pt states he is having a gallbladder attack, he states he ate a Debra cheese steak last night around 2030 and he has known gallbladder disease. Pt states he was suppose to have his gallbladder removed yesterday and due to abnormal EKG he needs to follow up with cardiology prior to surgery.

## 2024-07-11 NOTE — LETTER
July 11, 2024    Patient: Uriel Gomez   YOB: 2000   Date of Visit: 7/11/2024       To Whom It May Concern:    Uriel Gomez was seen and treated in our emergency department on 7/11/2024. He  may return to work 7/13/2024 .    If you have any questions or concerns, please don't hesitate to call.            Natalee Murphy RN

## 2024-07-11 NOTE — ED PROVIDER NOTES
[facility]   ED  Provider Note  7/11/2024  7:55 AM  AC06/AC06      No chief complaint on file.       History of Present Illness:   Uriel Gomez is a 24 y.o. male presenting to the ED for right upper quadrant pain this morning, beginning at 5 AM.  The complaint has been persistent, severe in severity, and worsened by nothing.  Patient is known to have gallstones.  He was scheduled for cholecystectomy yesterday but required cardiology clearance after having had an abnormal EKG.  He had a Debra steak sandwich for dinner last night.  He wake this morning with severe colicky right upper quadrant pain with nausea and vomiting.  He had similar pains in the past.  Urinalysis Protonix several weeks ago is not taking any.  He denies any other change in medication or diet.  He has no fever chills or hematemesis.  He has had no blood in the stool.      Review of Systems:   Pertinent positives and review of systems as noted above.  Remaining 10 review of systems is negative or noncontributory to today's episode of care.  Review of Systems       --------------------------------------------- PAST HISTORY ---------------------------------------------  Past Medical History:   Past Medical History:   Diagnosis Date   • Asthma ([facility])    • Cholelithiasis    • GERD (gastroesophageal reflux disease)    • H/O pericarditis 2012   • Obesity    • Sleep apnea    • Vitiligo         Past Surgical History:   Past Surgical History:   Procedure Laterality Date   • ADENOIDECTOMY     • TONSILLECTOMY          Social History:   Social History     Social History Narrative   • Not on file        Family History: family history includes Cancer in his mother; Diabetes in his mother; Hypertension in his father and mother; Mental illness in his mother. Unless otherwise noted, family history is non contributory    Patient's Medications   New Prescriptions    No medications on file   Previous Medications    ALBUTEROL 90 MCG/ACTUATION INHALER    Inhale 2  puffs every 6 hours if needed.    HYDROCODONE-ACETAMINOPHEN (NORCO) 5-325 MG TABLET    Take 1 tablet by mouth every 6 hours if needed for severe pain (7 - 10).    IBUPROFEN 600 MG TABLET    Take 1 tablet (600 mg) by mouth every 8 hours if needed for mild pain (1 - 3) or fever (temp greater than 38.0 C).   Modified Medications    No medications on file   Discontinued Medications    No medications on file      The patient’s home medications have been reviewed.    Allergies: Patient has no known allergies.    -------------------------------------------------- RESULTS -------------------------------------------------  All laboratory and radiology results have been personally reviewed by myself   LABS:  Labs Reviewed   COMPREHENSIVE METABOLIC PANEL - Abnormal       Result Value    Glucose 123 (*)     Sodium 141      Potassium 4.4      Chloride 106      Bicarbonate 25      Anion Gap 14      Urea Nitrogen 11      Creatinine 0.81      eGFR >90      Calcium 9.7      Albumin 4.4      Alkaline Phosphatase 50      Total Protein 7.6      AST 23      Bilirubin, Total 0.6      ALT 46     CBC WITH AUTO DIFFERENTIAL - Abnormal    WBC 7.6      nRBC 0.0      RBC 4.37 (*)     Hemoglobin 13.2 (*)     Hematocrit 38.6 (*)     MCV 88      MCH 30.2      MCHC 34.2      RDW 13.4      Platelets 235      Neutrophils % 58.4      Immature Granulocytes %, Automated 0.4      Lymphocytes % 32.1      Monocytes % 7.4      Eosinophils % 1.2      Basophils % 0.5      Neutrophils Absolute 4.41      Immature Granulocytes Absolute, Automated 0.03      Lymphocytes Absolute 2.42      Monocytes Absolute 0.56      Eosinophils Absolute 0.09      Basophils Absolute 0.04     LACTATE - Normal    Lactate 2.0      Narrative:     Venipuncture immediately after or during the administration of Metamizole may lead to falsely low results. Testing should be performed immediately  prior to Metamizole dosing.   LIPASE - Normal    Lipase 31      Narrative:     Venipuncture  immediately after or during the administration of Metamizole may lead to falsely low results. Testing should be performed immediately prior to Metamizole dosing.   URINE CULTURE   URINALYSIS WITH REFLEX CULTURE AND MICROSCOPIC    Narrative:     The following orders were created for panel order Urinalysis with Reflex Culture and Microscopic.  Procedure                               Abnormality         Status                     ---------                               -----------         ------                     Urinalysis with Reflex C...[[id number]]                                                 Extra Urine Gray Tube[[id number]]                                                         Please view results for these tests on the individual orders.   URINALYSIS WITH REFLEX CULTURE AND MICROSCOPIC   EXTRA URINE GRAY TUBE     EKG: Sinus bradycardia 55 bpm, normal axis, normal intervals, no acute ST elevations.  Interpreted by KELLEY Olivas MD    RADIOLOGY:  Interpreted by Radiologist.  US gallbladder   Final Result   Distended gallbladder with gallstones and gallbladder wall thickening   and positive ultrasonic Sorensen sign suspicious for acute   cholecystitis.        Echogenic fatty infiltrated liver.        MACRO:   None             Signed by: Rama House 7/11/2024 10:17 AM   Dictation workstation:   [id number]          Encounter Date: 05/28/24   ECG 12 lead   Result Value    Ventricular Rate 71    Atrial Rate 71    MD Interval 144    QRS Duration 108    QT Interval 398    QTC Calculation(Bazett) 432    P Axis 38    R Axis -27    T Axis 24    QRS Count 12    Q Onset 224    P Onset 152    P Offset 208    T Offset 423    QTC Fredericia 421    Narrative    Normal sinus rhythm with sinus arrhythmia  Cannot rule out Inferior infarct , age undetermined  Abnormal ECG  When compared with ECG of 12-DEC-2012 22:16,  PREVIOUS ECG IS PRESENT  See ED provider note for full interpretation and clinical correlation  Confirmed by  "Vianney Davis ([id number]) on 5/29/2024 10:53:15 AM     ------------------------- NURSING NOTES AND VITALS REVIEWED ---------------------------   The nursing notes within the ED encounter and vital signs as below have been reviewed.   /80 (BP Location: Right arm)   Pulse 55   Temp 36.4 °C (97.5 °F) (Oral)   Ht 2.007 m (6' 7\")   Wt (!) 163 kg (360 lb)   SpO2 98%   BMI 40.56 kg/m²   Oxygen Saturation Interpretation: Normal      ---------------------------------------------------PHYSICAL EXAM--------------------------------------  Physical Exam   Constitutional/General: Alert, uncomfortable appearing, non toxic in NAD  Head: Normocephalic and atraumatic  Eyes: PERRL, EOMI, conjunctiva normal, sclera non icteric  Mouth: Oropharynx clear, handling secretions, no trismus, no asymmetry of the posterior oropharynx or uvular edema  Neck: Supple, full ROM, non tender to palpation in the midline, no stridor, no crepitus, no meningeal signs  Respiratory: Lungs clear to auscultation bilaterally, no wheezes, rales, or rhonchi. Not in respiratory distress  Cardiovascular:  Regular rate. Regular rhythm. No murmurs, gallops, or rubs. 2+ distal pulses  Chest: No chest wall tenderness  GI:  Abdomen Soft, positive Sorensen sign non distended.  +BS. No organomegaly, no palpable masses,  No rebound, guarding, or rigidity.   Musculoskeletal: Moves all extremities x 4. Warm and well perfused, no clubbing, cyanosis, or edema. Capillary refill <3 seconds  Integument: skin warm and dry. No rashes.   Lymphatic: no lymphadenopathy noted  Neurologic: No focal deficits, symmetric strength 5/5 in the upper and lower extremities bilaterally  Psychiatric: Normal Affect    Procedures    ------------------------------ ED COURSE/MEDICAL DECISION MAKING----------------------  Diagnoses as of 07/11/24 1046   Calculus of gallbladder with acute on chronic cholecystitis without obstruction      Patient has known gallstones and is scheduled to see " Dr. Mcdonnell for cholecystectomy as soon as he gets med clearance from cardiology for abnormal EKG.  His pain is well-controlled now.  His ultrasound does show gallstones and mild gallbladder wall thickening.  White cell count liver function tests are normal.  Patient feels comfortable going home.  He is asked to follow-up with his surgeon and cardiologist as scheduled and return for worsening symptoms.  Prescriptions for Vicodin Zofran and Protonix were given.      Medical Decision Making:   Discharged to home.  Patient advised to follow low-fat diet and to return if his symptoms should worsen.  Diagnoses as of 07/11/24 1046   Calculus of gallbladder with acute on chronic cholecystitis without obstruction      Counseling:   The emergency provider has spoken with the patient and discussed today’s results, in addition to providing specific details for the plan of care and counseling regarding the diagnosis and prognosis.  Questions are answered at this time and they are agreeable with the plan.      --------------------------------- IMPRESSION AND DISPOSITION ---------------------------------        IMPRESSION  1. Calculus of gallbladder with acute on chronic cholecystitis without obstruction        DISPOSITION  Disposition: Discharge to home  Patient condition is fair      Billing Provider Critical Care Time: 0 minutes     Prasad Olivas MD  07/11/24 1046

## 2024-07-11 NOTE — LETTER
July 11, 2024    Patient: Uriel Gomez   YOB: 2000   Date of Visit: 7/11/2024       To Whom It May Concern:    Uriel Gomez was seen and treated in our emergency department on 7/11/2024. He  may return to work 7/12/2024 .    If you have any questions or concerns, please don't hesitate to call.              Natalee Murphy RN

## 2024-07-12 ENCOUNTER — HOSPITAL ENCOUNTER (OUTPATIENT)
Dept: CARDIOLOGY | Facility: HOSPITAL | Age: 24
Discharge: HOME | End: 2024-07-12
Payer: COMMERCIAL

## 2024-07-12 PROCEDURE — 93005 ELECTROCARDIOGRAM TRACING: CPT

## 2024-07-14 LAB
ATRIAL RATE: 55 BPM
P AXIS: 48 DEGREES
P OFFSET: 173 MS
P ONSET: 149 MS
PR INTERVAL: 112 MS
Q ONSET: 205 MS
QRS COUNT: 9 BEATS
QRS DURATION: 100 MS
QT INTERVAL: 432 MS
QTC CALCULATION(BAZETT): 413 MS
QTC FREDERICIA: 419 MS
R AXIS: -24 DEGREES
T AXIS: -9 DEGREES
T OFFSET: 421 MS
VENTRICULAR RATE: 55 BPM

## 2024-07-24 ENCOUNTER — OFFICE VISIT (OUTPATIENT)
Dept: PRIMARY CARE | Facility: CLINIC | Age: 24
End: 2024-07-24
Payer: COMMERCIAL

## 2024-07-24 VITALS
BODY MASS INDEX: 37.19 KG/M2 | SYSTOLIC BLOOD PRESSURE: 132 MMHG | TEMPERATURE: 97.7 F | WEIGHT: 315 LBS | HEIGHT: 77 IN | HEART RATE: 77 BPM | OXYGEN SATURATION: 97 % | RESPIRATION RATE: 16 BRPM | DIASTOLIC BLOOD PRESSURE: 80 MMHG

## 2024-07-24 DIAGNOSIS — G47.33 OBSTRUCTIVE SLEEP APNEA SYNDROME: Primary | ICD-10-CM

## 2024-07-24 PROCEDURE — 3008F BODY MASS INDEX DOCD: CPT | Performed by: FAMILY MEDICINE

## 2024-07-24 PROCEDURE — 99213 OFFICE O/P EST LOW 20 MIN: CPT | Performed by: FAMILY MEDICINE

## 2024-07-24 ASSESSMENT — PATIENT HEALTH QUESTIONNAIRE - PHQ9
SUM OF ALL RESPONSES TO PHQ9 QUESTIONS 1 AND 2: 0
2. FEELING DOWN, DEPRESSED OR HOPELESS: NOT AT ALL
1. LITTLE INTEREST OR PLEASURE IN DOING THINGS: NOT AT ALL

## 2024-07-24 ASSESSMENT — ENCOUNTER SYMPTOMS
DEPRESSION: 0
OCCASIONAL FEELINGS OF UNSTEADINESS: 0
LOSS OF SENSATION IN FEET: 0

## 2024-07-24 ASSESSMENT — PAIN SCALES - GENERAL: PAINLEVEL: 0-NO PAIN

## 2024-07-24 NOTE — PROGRESS NOTES
"tSubjective   Patient ID: Uriel Gomez is a 24 y.o. male who presents for Follow-up (Pt is here for a 2 month follow up since starting cpap.).    HPI He has been using CPAP for a couple of months now>  he had some trouble getting used to it but is doing better.  Some trouble with mask healing but only occasionally.  Feeling less tired.  He has cholecystectomy scheduled for August 5th also.     Review of Systems see HPI    Objective   /80 (BP Location: Left arm, Patient Position: Sitting, BP Cuff Size: Adult)   Pulse 77   Temp 36.5 °C (97.7 °F) (Temporal)   Resp 16   Ht 1.956 m (6' 5\")   Wt (!) 157 kg (346 lb)   SpO2 97%   BMI 41.03 kg/m²     Physical Exam  Constitutional:       General: He is not in acute distress.     Appearance: Normal appearance.   Cardiovascular:      Rate and Rhythm: Normal rate and regular rhythm.      Heart sounds: No murmur heard.  Pulmonary:      Breath sounds: Normal breath sounds. No wheezing.   Neurological:      Mental Status: He is alert.         Assessment/Plan   Problem List Items Addressed This Visit    None  Visit Diagnoses         Codes    Obstructive sleep apnea syndrome    -  Primary G47.33        Continue current care and follow up if any problems.        "

## 2024-07-25 ENCOUNTER — APPOINTMENT (OUTPATIENT)
Dept: SURGERY | Facility: CLINIC | Age: 24
End: 2024-07-25
Payer: COMMERCIAL

## 2024-07-29 PROBLEM — Z01.810 PREOP CARDIOVASCULAR EXAM: Status: ACTIVE | Noted: 2024-07-29

## 2024-07-31 ENCOUNTER — OFFICE VISIT (OUTPATIENT)
Dept: CARDIOLOGY | Facility: CLINIC | Age: 24
End: 2024-07-31
Payer: COMMERCIAL

## 2024-07-31 VITALS
SYSTOLIC BLOOD PRESSURE: 140 MMHG | BODY MASS INDEX: 41.86 KG/M2 | DIASTOLIC BLOOD PRESSURE: 90 MMHG | HEART RATE: 70 BPM | WEIGHT: 315 LBS

## 2024-07-31 DIAGNOSIS — Z01.810 PREOP CARDIOVASCULAR EXAM: Primary | ICD-10-CM

## 2024-07-31 DIAGNOSIS — I10 PRIMARY HYPERTENSION: ICD-10-CM

## 2024-07-31 PROCEDURE — 99204 OFFICE O/P NEW MOD 45 MIN: CPT | Performed by: INTERNAL MEDICINE

## 2024-07-31 PROCEDURE — 1036F TOBACCO NON-USER: CPT | Performed by: INTERNAL MEDICINE

## 2024-07-31 PROCEDURE — 99214 OFFICE O/P EST MOD 30 MIN: CPT | Performed by: INTERNAL MEDICINE

## 2024-07-31 PROCEDURE — 93010 ELECTROCARDIOGRAM REPORT: CPT | Performed by: INTERNAL MEDICINE

## 2024-07-31 PROCEDURE — 93005 ELECTROCARDIOGRAM TRACING: CPT | Performed by: INTERNAL MEDICINE

## 2024-07-31 PROCEDURE — 3077F SYST BP >= 140 MM HG: CPT | Performed by: INTERNAL MEDICINE

## 2024-07-31 PROCEDURE — 3080F DIAST BP >= 90 MM HG: CPT | Performed by: INTERNAL MEDICINE

## 2024-07-31 RX ORDER — AMLODIPINE BESYLATE 5 MG/1
5 TABLET ORAL DAILY
Qty: 90 TABLET | Refills: 3 | Status: SHIPPED | OUTPATIENT
Start: 2024-07-31 | End: 2025-07-31

## 2024-07-31 ASSESSMENT — PAIN SCALES - GENERAL: PAINLEVEL: 0-NO PAIN

## 2024-07-31 ASSESSMENT — ENCOUNTER SYMPTOMS
OCCASIONAL FEELINGS OF UNSTEADINESS: 0
DEPRESSION: 0
LOSS OF SENSATION IN FEET: 0

## 2024-07-31 NOTE — ASSESSMENT & PLAN NOTE
The patient is stable from a cardiac standpoint.  He exhibits no chest pain or anginal symptoms.  He has had no episodes of heart failure.  He has a good functional capacity working on a daily basis without any restrictions.  His twelve-lead ECG is normal.  Based on these factors I believe he may proceed with the cholecystectomy procedure at an acceptable low cardiovascular risk.

## 2024-07-31 NOTE — PROGRESS NOTES
Referred by Dr. Bhatia ref. provider found for [place]- Aug 5 2024 (Cholecystectomy )     History Of Present Illness:    Uriel Gomez is a 24 y.o. male presenting with preoperative cardiac risk assessment.  The patient had a couple of hospitalizations for acute cholecystitis and is now being scheduled for a cholecystectomy procedure.  The patient states that when he was 12 years of age he did have a viral infection that may have affected his heart.  He had no other episodes or problems since that time.  He describes no anginal type chest pain.  He states he will have some mild dizziness and shortness of breath if he exerts himself heavy in very warm weather but otherwise is doing quite well working on a daily basis without restrictions.      Past Medical History:  He has a past medical history of Asthma ([facility]), Cholelithiasis, GERD (gastroesophageal reflux disease), H/O pericarditis (2012), Obesity, Sleep apnea, and Vitiligo.    Past Surgical History:  He has a past surgical history that includes Tonsillectomy and Adenoidectomy.      Social History:  He reports that he has never smoked. He has never been exposed to tobacco smoke. He has never used smokeless tobacco. He reports that he does not drink alcohol and does not use drugs.  Works as a [employer]   Unmarried    Family History:  Grandparents had hypertension but mother and father without any history of cardiovascular disease     Allergies:  Patient has no known allergies.    Outpatient Medications:  Current Outpatient Medications   Medication Instructions    albuterol 90 mcg/actuation inhaler 2 puffs, inhalation, Every 6 hours PRN    amLODIPine (NORVASC) 5 mg, oral, Daily    HYDROcodone-acetaminophen (Norco) 5-325 mg tablet 1 tablet, oral, Every 6 hours PRN    ibuprofen 600 mg, oral, Every 8 hours PRN    ondansetron ODT (ZOFRAN-ODT) 4 mg, oral, Every 8 hours PRN    pantoprazole (PROTONIX) 40 mg, oral, Daily before breakfast, Do not crush, chew, or split.       "  Last Recorded Vitals:  Vitals:    07/31/24 1402   BP: 140/90   Pulse: 70   Weight: (!) 160 kg (353 lb)       Physical Exam:  Constitutional:       Appearance: Not in distress.   Eyes:      Conjunctiva/sclera: Conjunctivae normal.   HENT:    Mouth/Throat:      Pharynx: Oropharynx is clear.   Neck:      Vascular: No carotid bruit. JVD normal.   Pulmonary:      Breath sounds: Normal breath sounds. No wheezing. No rales.   Cardiovascular:      Regular rhythm.      Murmurs: There is no murmur.      No gallop.  No click. No rub.   Abdominal:      Palpations: Abdomen is soft.      Tenderness: There is no abdominal tenderness.   Musculoskeletal:         General: No deformity. Neurological:      General: No focal deficit present.             Last Labs:  CBC -  Lab Results   Component Value Date    WBC 7.6 07/11/2024    HGB 13.2 (L) 07/11/2024    HCT 38.6 (L) 07/11/2024    MCV 88 07/11/2024     07/11/2024       CMP -  Lab Results   Component Value Date    CALCIUM 9.7 07/11/2024    PROT 7.6 07/11/2024    ALBUMIN 4.4 07/11/2024    AST 23 07/11/2024    ALT 46 07/11/2024    ALKPHOS 50 07/11/2024    BILITOT 0.6 07/11/2024       LIPID PANEL -   Lab Results   Component Value Date    CHOL 151 03/21/2024    TRIG 150 03/21/2024    HDL 32.0 (L) 03/21/2024    CHHDL 4.7 03/21/2024       RENAL FUNCTION PANEL -   Lab Results   Component Value Date    GLUCOSE 123 (H) 07/11/2024     07/11/2024    K 4.4 07/11/2024     07/11/2024    CO2 25 07/11/2024    ANIONGAP 14 07/11/2024    BUN 11 07/11/2024    CREATININE 0.81 07/11/2024    CALCIUM 9.7 07/11/2024    ALBUMIN 4.4 07/11/2024        No results found for: \"BNP\", \"HGBA1C\"    Last Cardiology Tests:  ECG:  ECG 12 lead 07/12/2024  Normal sinus rhythm normal ECG  Echo:  No results found for this or any previous visit from the past 1095 days.      Ejection Fractions:  No results found for: \"EF\"    Cath:  No results found for this or any previous visit from the past 1095 " days.      Stress Test:  No results found for this or any previous visit from the past 1095 days.      Cardiac Imaging:  No results found for this or any previous visit from the past 1095 days.            Assessment/Plan     Preop cardiovascular exam  The patient is stable from a cardiac standpoint.  He exhibits no chest pain or anginal symptoms.  He has had no episodes of heart failure.  He has a good functional capacity working on a daily basis without any restrictions.  His twelve-lead ECG is normal.  Based on these factors I believe he may proceed with the cholecystectomy procedure at an acceptable low cardiovascular risk.    Primary hypertension  Blood pressure is mildly elevated today.  He states it has been elevated at other times as well.  Reviewed laboratory studies and see nothing to suggest a secondary cause of hypertension.  Will add amlodipine 5 mg once daily and bring the patient back for repeat assessment in approximately 12 weeks.       Giuliano Colindres, DO

## 2024-07-31 NOTE — ASSESSMENT & PLAN NOTE
Blood pressure is mildly elevated today.  He states it has been elevated at other times as well.  Reviewed laboratory studies and see nothing to suggest a secondary cause of hypertension.  Will add amlodipine 5 mg once daily and bring the patient back for repeat assessment in approximately 12 weeks.

## 2024-08-02 ENCOUNTER — PRE-ADMISSION TESTING (OUTPATIENT)
Dept: PREADMISSION TESTING | Facility: HOSPITAL | Age: 24
End: 2024-08-02
Payer: COMMERCIAL

## 2024-08-02 VITALS
TEMPERATURE: 97.2 F | HEART RATE: 69 BPM | DIASTOLIC BLOOD PRESSURE: 75 MMHG | OXYGEN SATURATION: 99 % | WEIGHT: 315 LBS | HEIGHT: 78 IN | RESPIRATION RATE: 16 BRPM | BODY MASS INDEX: 36.45 KG/M2 | SYSTOLIC BLOOD PRESSURE: 134 MMHG

## 2024-08-02 PROCEDURE — 99203 OFFICE O/P NEW LOW 30 MIN: CPT | Performed by: NURSE PRACTITIONER

## 2024-08-02 ASSESSMENT — DUKE ACTIVITY SCORE INDEX (DASI)
CAN YOU WALK INDOORS, SUCH AS AROUND YOUR HOUSE: YES
CAN YOU DO MODERATE WORK AROUND THE HOUSE LIKE VACUUMING, SWEEPING FLOORS OR CARRYING GROCERIES: YES
CAN YOU PARTICIPATE IN STRENOUS SPORTS LIKE SWIMMING, SINGLES TENNIS, FOOTBALL, BASKETBALL, OR SKIING: YES
CAN YOU PARTICIPATE IN MODERATE RECREATIONAL ACTIVITIES LIKE GOLF, BOWLING, DANCING, DOUBLES TENNIS OR THROWING A BASEBALL OR FOOTBALL: YES
CAN YOU DO HEAVY WORK AROUND THE HOUSE LIKE SCRUBBING FLOORS OR LIFTING AND MOVING HEAVY FURNITURE: YES
CAN YOU TAKE CARE OF YOURSELF (EAT, DRESS, BATHE, OR USE TOILET): YES
CAN YOU DO LIGHT WORK AROUND THE HOUSE LIKE DUSTING OR WASHING DISHES: YES
CAN YOU WALK A BLOCK OR TWO ON LEVEL GROUND: YES
CAN YOU RUN A SHORT DISTANCE: YES
CAN YOU HAVE SEXUAL RELATIONS: YES
CAN YOU CLIMB A FLIGHT OF STAIRS OR WALK UP A HILL: YES
DASI METS SCORE: 9.9
TOTAL_SCORE: 58.2
CAN YOU DO YARD WORK LIKE RAKING LEAVES, WEEDING OR PUSHING A MOWER: YES

## 2024-08-02 ASSESSMENT — ENCOUNTER SYMPTOMS
CARDIOVASCULAR NEGATIVE: 1
CONSTITUTIONAL NEGATIVE: 1
ARTHRALGIAS: 1
RESPIRATORY NEGATIVE: 1
EYES NEGATIVE: 1
PSYCHIATRIC NEGATIVE: 1
ALLERGIC/IMMUNOLOGIC NEGATIVE: 1
DIZZINESS: 1
HEMATOLOGIC/LYMPHATIC NEGATIVE: 1
ENDOCRINE NEGATIVE: 1

## 2024-08-02 NOTE — CPM/PAT H&P
CPM/PAT Evaluation       Name: Uriel Gomez (Uriel Gomez)  /Age: 2000/ y.o.     In-Person       Chief Complaint: RUQ pain    HPI    Pt is a 24 year old male who has been experiencing random episodes of RUQ pain. Pt was diagnosed back in May  with gallstones. After experiencing several episodes of RUQ pain pt was advised to follow up with a surgeon. Pt was examined by his surgeon and has been scheduled for laparoscopic cholecystectomy. Pt denies CP, SOB, or dizziness.     Past Medical History:   Diagnosis Date    Asthma ([facility])     Cholelithiasis     GERD (gastroesophageal reflux disease)     H/O pericarditis     after viral illness    Hypertension     Obesity     Sleep apnea     Vitiligo      Past Surgical History:   Procedure Laterality Date    ADENOIDECTOMY      TONSILLECTOMY       Social History     Tobacco Use    Smoking status: Never     Passive exposure: Never    Smokeless tobacco: Never   Substance Use Topics    Alcohol use: Never     Social History     Substance and Sexual Activity   Drug Use Never     Patient Sexual activity questions deferred to the physician.    Family History   Problem Relation Name Age of Onset    Mental illness Mother      Hypertension Mother      Diabetes Mother      Cancer Mother      Hypertension Father       No Known Allergies    Current Outpatient Medications   Medication Sig Dispense Refill    albuterol 90 mcg/actuation inhaler Inhale 2 puffs every 6 hours if needed.      amLODIPine (Norvasc) 5 mg tablet Take 1 tablet (5 mg) by mouth once daily. 90 tablet 3    HYDROcodone-acetaminophen (Norco) 5-325 mg tablet Take 1 tablet by mouth every 6 hours if needed for severe pain (7 - 10).      ibuprofen 600 mg tablet Take 1 tablet (600 mg) by mouth every 8 hours if needed for mild pain (1 - 3) or fever (temp greater than 38.0 C). 30 tablet 0    ondansetron ODT (Zofran-ODT) 4 mg disintegrating tablet Take 1 tablet (4 mg) by mouth every 8 hours if needed for nausea or  "vomiting. 15 tablet 0    pantoprazole (ProtoNix) 40 mg EC tablet Take 1 tablet (40 mg) by mouth once daily in the morning. Take before meals. Do not crush, chew, or split. 30 tablet 0     No current facility-administered medications for this visit.     Review of Systems   Constitutional: Negative.    HENT: Negative.     Eyes: Negative.    Respiratory: Negative.     Cardiovascular: Negative.    Gastrointestinal:         Occasional RUQ pain    Endocrine: Negative.    Genitourinary: Negative.    Musculoskeletal:  Positive for arthralgias.   Skin: Negative.    Allergic/Immunologic: Negative.    Neurological:  Positive for dizziness (some dizziness when performing heavy work in hot weather).   Hematological: Negative.    Psychiatric/Behavioral: Negative.       /75   Pulse 69   Temp 36.2 °C (97.2 °F) (Temporal)   Resp 16   Ht 2.007 m (6' 7\")   Wt (!) 161 kg (355 lb)   SpO2 99%   BMI 39.99 kg/m²     Physical Exam  Vitals reviewed.   Constitutional:       Appearance: He is obese.   HENT:      Head: Normocephalic and atraumatic.      Nose: Nose normal.      Mouth/Throat:      Mouth: Mucous membranes are moist.      Pharynx: Oropharynx is clear.   Eyes:      Extraocular Movements: Extraocular movements intact.      Conjunctiva/sclera: Conjunctivae normal.      Pupils: Pupils are equal, round, and reactive to light.   Cardiovascular:      Rate and Rhythm: Normal rate and regular rhythm.      Pulses: Normal pulses.      Heart sounds: Normal heart sounds.   Pulmonary:      Effort: Pulmonary effort is normal. No respiratory distress.      Breath sounds: Normal breath sounds. No stridor. No wheezing, rhonchi or rales.   Abdominal:      General: Bowel sounds are normal.      Palpations: Abdomen is soft.      Tenderness: There is no abdominal tenderness. There is no guarding or rebound.   Musculoskeletal:         General: Normal range of motion.      Cervical back: Normal range of motion and neck supple.   Skin:     " "General: Skin is warm and dry.      Comments: Vitiligo noted to face and bilateral hands.    Neurological:      General: No focal deficit present.      Mental Status: He is alert and oriented to person, place, and time. Mental status is at baseline.   Psychiatric:         Mood and Affect: Mood normal.         Behavior: Behavior normal.         Thought Content: Thought content normal.         Judgment: Judgment normal.        PAT AIRWAY:   Airway:     Mallampati::  IV    TM distance::  >3 FB    Neck ROM::  Full  normal      ASA: 2  DASI: 58.2  METS: 9.9  CHADS: 2.8%  RCRI: 0.4%  STOP BAN    Assessment and Plan:     Calculus of gallbladder with chronic cholecystitis without obstruction; Umbilical hernia with obstruction without gangrene: Cholecystectomy Laparoscopy.   HTN: pt is managed with amlodipine.  ALAINA: compliant with CPAP use.   Asthma: managed with as needed albuterol.   GERD:managed with pantoprazole.   H/O pericarditis after a viral illness in .  BMI: 39.99    CBC and CMP completed on 2024.  EKG completed completed on 2024.    Dr Colindres cardiac clearance in EPIC:   \"Preop cardiovascular exam  The patient is stable from a cardiac standpoint.  He exhibits no chest pain or anginal symptoms.  He has had no episodes of heart failure.  He has a good functional capacity working on a daily basis without any restrictions.  His twelve-lead ECG is normal.  Based on these factors I believe he may proceed with the cholecystectomy procedure at an acceptable low cardiovascular risk.\"    HELEN Brian-CNP    "

## 2024-08-02 NOTE — H&P (VIEW-ONLY)
CPM/PAT Evaluation       Name: Uriel Gomez (Uriel Gomez)  /Age: 2000/ y.o.     In-Person       Chief Complaint: RUQ pain    HPI    Pt is a 24 year old male who has been experiencing random episodes of RUQ pain. Pt was diagnosed back in May  with gallstones. After experiencing several episodes of RUQ pain pt was advised to follow up with a surgeon. Pt was examined by his surgeon and has been scheduled for laparoscopic cholecystectomy. Pt denies CP, SOB, or dizziness.     Past Medical History:   Diagnosis Date    Asthma (HHS-HCC)     Cholelithiasis     GERD (gastroesophageal reflux disease)     H/O pericarditis     after viral illness    Hypertension     Obesity     Sleep apnea     Vitiligo      Past Surgical History:   Procedure Laterality Date    ADENOIDECTOMY      TONSILLECTOMY       Social History     Tobacco Use    Smoking status: Never     Passive exposure: Never    Smokeless tobacco: Never   Substance Use Topics    Alcohol use: Never     Social History     Substance and Sexual Activity   Drug Use Never     Patient Sexual activity questions deferred to the physician.    Family History   Problem Relation Name Age of Onset    Mental illness Mother      Hypertension Mother      Diabetes Mother      Cancer Mother      Hypertension Father       No Known Allergies    Current Outpatient Medications   Medication Sig Dispense Refill    albuterol 90 mcg/actuation inhaler Inhale 2 puffs every 6 hours if needed.      amLODIPine (Norvasc) 5 mg tablet Take 1 tablet (5 mg) by mouth once daily. 90 tablet 3    HYDROcodone-acetaminophen (Norco) 5-325 mg tablet Take 1 tablet by mouth every 6 hours if needed for severe pain (7 - 10).      ibuprofen 600 mg tablet Take 1 tablet (600 mg) by mouth every 8 hours if needed for mild pain (1 - 3) or fever (temp greater than 38.0 C). 30 tablet 0    ondansetron ODT (Zofran-ODT) 4 mg disintegrating tablet Take 1 tablet (4 mg) by mouth every 8 hours if needed for nausea or  "vomiting. 15 tablet 0    pantoprazole (ProtoNix) 40 mg EC tablet Take 1 tablet (40 mg) by mouth once daily in the morning. Take before meals. Do not crush, chew, or split. 30 tablet 0     No current facility-administered medications for this visit.     Review of Systems   Constitutional: Negative.    HENT: Negative.     Eyes: Negative.    Respiratory: Negative.     Cardiovascular: Negative.    Gastrointestinal:         Occasional RUQ pain    Endocrine: Negative.    Genitourinary: Negative.    Musculoskeletal:  Positive for arthralgias.   Skin: Negative.    Allergic/Immunologic: Negative.    Neurological:  Positive for dizziness (some dizziness when performing heavy work in hot weather).   Hematological: Negative.    Psychiatric/Behavioral: Negative.       /75   Pulse 69   Temp 36.2 °C (97.2 °F) (Temporal)   Resp 16   Ht 2.007 m (6' 7\")   Wt (!) 161 kg (355 lb)   SpO2 99%   BMI 39.99 kg/m²     Physical Exam  Vitals reviewed.   Constitutional:       Appearance: He is obese.   HENT:      Head: Normocephalic and atraumatic.      Nose: Nose normal.      Mouth/Throat:      Mouth: Mucous membranes are moist.      Pharynx: Oropharynx is clear.   Eyes:      Extraocular Movements: Extraocular movements intact.      Conjunctiva/sclera: Conjunctivae normal.      Pupils: Pupils are equal, round, and reactive to light.   Cardiovascular:      Rate and Rhythm: Normal rate and regular rhythm.      Pulses: Normal pulses.      Heart sounds: Normal heart sounds.   Pulmonary:      Effort: Pulmonary effort is normal. No respiratory distress.      Breath sounds: Normal breath sounds. No stridor. No wheezing, rhonchi or rales.   Abdominal:      General: Bowel sounds are normal.      Palpations: Abdomen is soft.      Tenderness: There is no abdominal tenderness. There is no guarding or rebound.   Musculoskeletal:         General: Normal range of motion.      Cervical back: Normal range of motion and neck supple.   Skin:     " "General: Skin is warm and dry.      Comments: Vitiligo noted to face and bilateral hands.    Neurological:      General: No focal deficit present.      Mental Status: He is alert and oriented to person, place, and time. Mental status is at baseline.   Psychiatric:         Mood and Affect: Mood normal.         Behavior: Behavior normal.         Thought Content: Thought content normal.         Judgment: Judgment normal.        PAT AIRWAY:   Airway:     Mallampati::  IV    TM distance::  >3 FB    Neck ROM::  Full  normal      ASA: 2  DASI: 58.2  METS: 9.9  CHADS: 2.8%  RCRI: 0.4%  STOP BAN    Assessment and Plan:     Calculus of gallbladder with chronic cholecystitis without obstruction; Umbilical hernia with obstruction without gangrene: Cholecystectomy Laparoscopy.   HTN: pt is managed with amlodipine.  ALAINA: compliant with CPAP use.   Asthma: managed with as needed albuterol.   GERD:managed with pantoprazole.   H/O pericarditis after a viral illness in .  BMI: 39.99    CBC and CMP completed on 2024.  EKG completed completed on 2024.    Dr Colindres cardiac clearance in EPIC:   \"Preop cardiovascular exam  The patient is stable from a cardiac standpoint.  He exhibits no chest pain or anginal symptoms.  He has had no episodes of heart failure.  He has a good functional capacity working on a daily basis without any restrictions.  His twelve-lead ECG is normal.  Based on these factors I believe he may proceed with the cholecystectomy procedure at an acceptable low cardiovascular risk.\"    HELEN Brina-CNP    "

## 2024-08-02 NOTE — PREPROCEDURE INSTRUCTIONS
PAT DISCHARGE INSTRUCTIONS    Please call the Same Day Surgery (SDS) Department of the hospital where your procedure will be performed after 2:00 PM the day before your surgery. If you are scheduled on a Monday, or a Tuesday following a Monday holiday, you will need to call on the last business day prior to your surgery.    [facility]  [address], [address]  [phone]    [facility]  [address] [place] [address] [phone]    [facility]  [address]  [place] [id number]  [phone]    Please let your surgeon know if:      You develop any open sores, shingles, burning or painful urination as these may increase your risk of an infection.   You no longer wish to have the surgery.   Any other personal circumstances change that may lead to the need to cancel or defer this surgery-such as being sick or getting admitted to any hospital within one week of your planned procedure.    Your contact details change, such as a change of address or phone number.    Starting now:     Please DO NOT drink alcohol or smoke for 24 hours before surgery. It is well known that quitting smoking can make a huge difference to your health and recovery from surgery. The longer you abstain from smoking, the better your chances of a healthy recovery. If you need help with quitting, call 1-800-QUIT-NOW to be connected to a trained counselor who will discuss the best methods to help you quit.     Before your surgery:    Please stop all supplements 7 days prior to surgery. Or as directed by your surgeon.   Please stop taking NSAID pain medicine such as Advil and Motrin 7 days before surgery.    If you develop any fever, cough, cold, rashes, cuts, scratches, scrapes, urinary symptoms or infection anywhere on your body (including teeth and gums) prior to surgery, please call your surgeon’s office as soon as  possible. This may require treatment to reduce the chance of cancellation on the day of surgery.    The day before your surgery:   Get a good night’s rest.  Use the special soap for bathing if you have been instructed to use one.    Scheduled surgery times may change and you will be notified if this occurs - please check your personal voicemail for any updates.     On the morning of surgery:   Wear comfortable, loose fitting clothes which open in the front. Please do not wear moisturizers, creams, lotions, makeup or perfume.    Please bring with you to surgery:   Photo ID and insurance card   Current list of medicines and allergies   Pacemaker/ Defibrillator/Heart stent cards   CPAP machine and mask    Slings/ splints/ crutches   A copy of your complete advanced directive/DHPOA.    Please do NOT bring with you to surgery:   All jewelry and valuables should be left at home.   Prosthetic devices such as contact lenses, hearing aids, dentures, eyelash extensions, hairpins and body piercings must be removed prior to going in to the surgical suite.    After outpatient surgery:   A responsible adult MUST accompany you at the time of discharge and stay with you for 24 hours after your surgery. You may NOT drive yourself home after surgery.    Do not drive, operate machinery, make critical decisions or do activities that require co-ordination or balance until after a night’s sleep.   Do not drink alcoholic beverages for 24 hours.   Instructions for resuming your medications will be provided by your surgeon.    CALL YOUR DOCTOR AFTER SURGERY IF YOU HAVE:     Chills and/or a fever of 101° F or higher.    Redness, swelling, pus or drainage from your surgical wound or a bad smell from the wound.    Lightheadedness, fainting or confusion.    Persistent vomiting (throwing up) and are not able to eat or drink for 12 hours.    Three or more loose, watery bowel movements in 24 hours (diarrhea).   Difficulty or pain while urinating(  after non-urological surgery)    Pain and swelling in your legs, especially if it is only on one side.    Difficulty breathing or are breathing faster than normal.    Any new concerning symptoms.        Preoperative Fasting Guidelines    Why must I stop eating and drinking near surgery time?  With sedation, food or liquid in your stomach can enter your lungs causing serious complications  Increases nausea and vomiting    When do I need to stop eating and drinking before my surgery?  Do not eat any food after midnight the night before your surgery/procedure.  You may have up to 13 ounces of clear liquid until TWO hours before your instructed arrival time to the hospital.  This includes water, black tea/coffee, (no milk or cream) apple juice, and electrolyte drinks (Gatorade)  You may chew gum until TWO hours before your surgery/procedure     Medication List            Accurate as of August 2, 2024  7:36 AM. Always use your most recent med list.                albuterol 90 mcg/actuation inhaler  Medication Adjustments for Surgery: Other (Comment)  Notes to patient: CONTINUE IF NEEDED     amLODIPine 5 mg tablet  Commonly known as: Norvasc  Take 1 tablet (5 mg) by mouth once daily.  Medication Adjustments for Surgery: Take morning of surgery with sip of water, no other fluids     HYDROcodone-acetaminophen 5-325 mg tablet  Commonly known as: Norco  Medication Adjustments for Surgery: Other (Comment)  Notes to patient: CONTINUE IF NEEDED     ibuprofen 600 mg tablet  Take 1 tablet (600 mg) by mouth every 8 hours if needed for mild pain (1 - 3) or fever (temp greater than 38.0 C).  Medication Adjustments for Surgery: Stop 7 days before surgery     ondansetron ODT 4 mg disintegrating tablet  Commonly known as: Zofran-ODT  Take 1 tablet (4 mg) by mouth every 8 hours if needed for nausea or vomiting.  Medication Adjustments for Surgery: Other (Comment)  Notes to patient: CONTINUE IF NEEDED     pantoprazole 40 mg EC  tablet  Commonly known as: ProtoNix  Take 1 tablet (40 mg) by mouth once daily in the morning. Take before meals. Do not crush, chew, or split.  Medication Adjustments for Surgery: Take morning of surgery with sip of water, no other fluids

## 2024-08-05 ENCOUNTER — ANESTHESIA EVENT (OUTPATIENT)
Dept: OPERATING ROOM | Facility: HOSPITAL | Age: 24
End: 2024-08-05
Payer: COMMERCIAL

## 2024-08-05 ENCOUNTER — PHARMACY VISIT (OUTPATIENT)
Dept: PHARMACY | Facility: CLINIC | Age: 24
End: 2024-08-05
Payer: COMMERCIAL

## 2024-08-05 ENCOUNTER — ANESTHESIA (OUTPATIENT)
Dept: OPERATING ROOM | Facility: HOSPITAL | Age: 24
End: 2024-08-05
Payer: COMMERCIAL

## 2024-08-05 ENCOUNTER — HOSPITAL ENCOUNTER (OUTPATIENT)
Facility: HOSPITAL | Age: 24
Setting detail: OUTPATIENT SURGERY
Discharge: HOME | End: 2024-08-05
Attending: SURGERY | Admitting: SURGERY
Payer: COMMERCIAL

## 2024-08-05 VITALS
SYSTOLIC BLOOD PRESSURE: 124 MMHG | OXYGEN SATURATION: 97 % | DIASTOLIC BLOOD PRESSURE: 74 MMHG | HEART RATE: 78 BPM | RESPIRATION RATE: 20 BRPM | TEMPERATURE: 97.2 F

## 2024-08-05 DIAGNOSIS — K42.0 UMBILICAL HERNIA WITH OBSTRUCTION, WITHOUT GANGRENE: ICD-10-CM

## 2024-08-05 DIAGNOSIS — K80.10 CALCULUS OF GALLBLADDER WITH CHRONIC CHOLECYSTITIS WITHOUT OBSTRUCTION: Primary | ICD-10-CM

## 2024-08-05 PROCEDURE — 7100000002 HC RECOVERY ROOM TIME - EACH INCREMENTAL 1 MINUTE: Performed by: SURGERY

## 2024-08-05 PROCEDURE — A47562 PR LAP,CHOLECYSTECTOMY: Performed by: ANESTHESIOLOGY

## 2024-08-05 PROCEDURE — 2780000003 HC OR 278 NO HCPCS: Performed by: SURGERY

## 2024-08-05 PROCEDURE — 47562 LAPAROSCOPIC CHOLECYSTECTOMY: CPT | Performed by: SURGERY

## 2024-08-05 PROCEDURE — 2500000004 HC RX 250 GENERAL PHARMACY W/ HCPCS (ALT 636 FOR OP/ED): Performed by: SURGERY

## 2024-08-05 PROCEDURE — RXMED WILLOW AMBULATORY MEDICATION CHARGE

## 2024-08-05 PROCEDURE — 2720000007 HC OR 272 NO HCPCS: Performed by: SURGERY

## 2024-08-05 PROCEDURE — 2500000004 HC RX 250 GENERAL PHARMACY W/ HCPCS (ALT 636 FOR OP/ED): Performed by: ANESTHESIOLOGY

## 2024-08-05 PROCEDURE — 3600000003 HC OR TIME - INITIAL BASE CHARGE - PROCEDURE LEVEL THREE: Performed by: SURGERY

## 2024-08-05 PROCEDURE — 88304 TISSUE EXAM BY PATHOLOGIST: CPT | Mod: TC | Performed by: SURGERY

## 2024-08-05 PROCEDURE — 3600000008 HC OR TIME - EACH INCREMENTAL 1 MINUTE - PROCEDURE LEVEL THREE: Performed by: SURGERY

## 2024-08-05 PROCEDURE — 3700000002 HC GENERAL ANESTHESIA TIME - EACH INCREMENTAL 1 MINUTE: Performed by: SURGERY

## 2024-08-05 PROCEDURE — 7100000010 HC PHASE TWO TIME - EACH INCREMENTAL 1 MINUTE: Performed by: SURGERY

## 2024-08-05 PROCEDURE — A47562 PR LAP,CHOLECYSTECTOMY: Performed by: NURSE ANESTHETIST, CERTIFIED REGISTERED

## 2024-08-05 PROCEDURE — 49592 RPR AA HRN 1ST < 3 NCR/STRN: CPT | Performed by: SURGERY

## 2024-08-05 PROCEDURE — 94660 CPAP INITIATION&MGMT: CPT

## 2024-08-05 PROCEDURE — 7100000001 HC RECOVERY ROOM TIME - INITIAL BASE CHARGE: Performed by: SURGERY

## 2024-08-05 PROCEDURE — 3700000001 HC GENERAL ANESTHESIA TIME - INITIAL BASE CHARGE: Performed by: SURGERY

## 2024-08-05 PROCEDURE — 2500000005 HC RX 250 GENERAL PHARMACY W/O HCPCS: Performed by: SURGERY

## 2024-08-05 PROCEDURE — A4550 SURGICAL TRAYS: HCPCS | Performed by: SURGERY

## 2024-08-05 PROCEDURE — 2500000005 HC RX 250 GENERAL PHARMACY W/O HCPCS: Performed by: NURSE ANESTHETIST, CERTIFIED REGISTERED

## 2024-08-05 PROCEDURE — 2500000004 HC RX 250 GENERAL PHARMACY W/ HCPCS (ALT 636 FOR OP/ED): Performed by: NURSE ANESTHETIST, CERTIFIED REGISTERED

## 2024-08-05 PROCEDURE — 7100000009 HC PHASE TWO TIME - INITIAL BASE CHARGE: Performed by: SURGERY

## 2024-08-05 PROCEDURE — C1889 IMPLANT/INSERT DEVICE, NOC: HCPCS | Performed by: SURGERY

## 2024-08-05 RX ORDER — CEFAZOLIN SODIUM 2 G/100ML
2 INJECTION, SOLUTION INTRAVENOUS EVERY 8 HOURS
Status: CANCELLED | OUTPATIENT
Start: 2024-08-05 | End: 2024-08-05

## 2024-08-05 RX ORDER — ONDANSETRON HYDROCHLORIDE 2 MG/ML
INJECTION, SOLUTION INTRAVENOUS AS NEEDED
Status: DISCONTINUED | OUTPATIENT
Start: 2024-08-05 | End: 2024-08-05

## 2024-08-05 RX ORDER — CEFAZOLIN SODIUM IN 0.9 % NACL 3 G/100 ML
INTRAVENOUS SOLUTION, PIGGYBACK (ML) INTRAVENOUS AS NEEDED
Status: DISCONTINUED | OUTPATIENT
Start: 2024-08-05 | End: 2024-08-05

## 2024-08-05 RX ORDER — FENTANYL CITRATE 50 UG/ML
INJECTION, SOLUTION INTRAMUSCULAR; INTRAVENOUS AS NEEDED
Status: DISCONTINUED | OUTPATIENT
Start: 2024-08-05 | End: 2024-08-05

## 2024-08-05 RX ORDER — HYDROMORPHONE HYDROCHLORIDE 0.2 MG/ML
0.2 INJECTION INTRAMUSCULAR; INTRAVENOUS; SUBCUTANEOUS EVERY 5 MIN PRN
Status: DISCONTINUED | OUTPATIENT
Start: 2024-08-05 | End: 2024-08-05 | Stop reason: HOSPADM

## 2024-08-05 RX ORDER — LIDOCAINE HYDROCHLORIDE 10 MG/ML
0.1 INJECTION INFILTRATION; PERINEURAL ONCE
Status: DISCONTINUED | OUTPATIENT
Start: 2024-08-05 | End: 2024-08-05 | Stop reason: HOSPADM

## 2024-08-05 RX ORDER — PROPOFOL 10 MG/ML
INJECTION, EMULSION INTRAVENOUS AS NEEDED
Status: DISCONTINUED | OUTPATIENT
Start: 2024-08-05 | End: 2024-08-05

## 2024-08-05 RX ORDER — ALBUTEROL SULFATE 0.83 MG/ML
2.5 SOLUTION RESPIRATORY (INHALATION) ONCE
Status: DISCONTINUED | OUTPATIENT
Start: 2024-08-05 | End: 2024-08-05 | Stop reason: HOSPADM

## 2024-08-05 RX ORDER — ONDANSETRON HYDROCHLORIDE 2 MG/ML
4 INJECTION, SOLUTION INTRAVENOUS ONCE AS NEEDED
Status: COMPLETED | OUTPATIENT
Start: 2024-08-05 | End: 2024-08-05

## 2024-08-05 RX ORDER — OXYCODONE AND ACETAMINOPHEN 5; 325 MG/1; MG/1
1 TABLET ORAL EVERY 4 HOURS PRN
Qty: 30 TABLET | Refills: 0 | Status: SHIPPED | OUTPATIENT
Start: 2024-08-05

## 2024-08-05 RX ORDER — LIDOCAINE HYDROCHLORIDE 10 MG/ML
INJECTION INFILTRATION; PERINEURAL AS NEEDED
Status: DISCONTINUED | OUTPATIENT
Start: 2024-08-05 | End: 2024-08-05

## 2024-08-05 RX ORDER — MIDAZOLAM HYDROCHLORIDE 1 MG/ML
INJECTION, SOLUTION INTRAMUSCULAR; INTRAVENOUS AS NEEDED
Status: DISCONTINUED | OUTPATIENT
Start: 2024-08-05 | End: 2024-08-05

## 2024-08-05 RX ORDER — MEPERIDINE HYDROCHLORIDE 25 MG/ML
12.5 INJECTION INTRAMUSCULAR; INTRAVENOUS; SUBCUTANEOUS EVERY 10 MIN PRN
Status: DISCONTINUED | OUTPATIENT
Start: 2024-08-05 | End: 2024-08-05 | Stop reason: HOSPADM

## 2024-08-05 RX ORDER — BUPIVACAINE HYDROCHLORIDE 5 MG/ML
INJECTION, SOLUTION PERINEURAL AS NEEDED
Status: DISCONTINUED | OUTPATIENT
Start: 2024-08-05 | End: 2024-08-05 | Stop reason: HOSPADM

## 2024-08-05 RX ORDER — MIDAZOLAM HYDROCHLORIDE 1 MG/ML
1 INJECTION, SOLUTION INTRAMUSCULAR; INTRAVENOUS ONCE AS NEEDED
Status: DISCONTINUED | OUTPATIENT
Start: 2024-08-05 | End: 2024-08-05 | Stop reason: HOSPADM

## 2024-08-05 RX ORDER — SODIUM CHLORIDE, SODIUM LACTATE, POTASSIUM CHLORIDE, CALCIUM CHLORIDE 600; 310; 30; 20 MG/100ML; MG/100ML; MG/100ML; MG/100ML
100 INJECTION, SOLUTION INTRAVENOUS CONTINUOUS
Status: DISCONTINUED | OUTPATIENT
Start: 2024-08-05 | End: 2024-08-05 | Stop reason: HOSPADM

## 2024-08-05 RX ORDER — ROCURONIUM BROMIDE 10 MG/ML
INJECTION, SOLUTION INTRAVENOUS AS NEEDED
Status: DISCONTINUED | OUTPATIENT
Start: 2024-08-05 | End: 2024-08-05

## 2024-08-05 RX ORDER — FENTANYL CITRATE 50 UG/ML
50 INJECTION, SOLUTION INTRAMUSCULAR; INTRAVENOUS EVERY 5 MIN PRN
Status: DISCONTINUED | OUTPATIENT
Start: 2024-08-05 | End: 2024-08-05 | Stop reason: HOSPADM

## 2024-08-05 RX ORDER — PHENYLEPHRINE HCL IN 0.9% NACL 1 MG/10 ML
SYRINGE (ML) INTRAVENOUS AS NEEDED
Status: DISCONTINUED | OUTPATIENT
Start: 2024-08-05 | End: 2024-08-05

## 2024-08-05 RX ORDER — LIDOCAINE HYDROCHLORIDE AND EPINEPHRINE 10; 10 MG/ML; UG/ML
INJECTION, SOLUTION INFILTRATION; PERINEURAL AS NEEDED
Status: DISCONTINUED | OUTPATIENT
Start: 2024-08-05 | End: 2024-08-05 | Stop reason: HOSPADM

## 2024-08-05 ASSESSMENT — PAIN DESCRIPTION - DESCRIPTORS
DESCRIPTORS: ACHING
DESCRIPTORS: ACHING

## 2024-08-05 ASSESSMENT — PAIN - FUNCTIONAL ASSESSMENT
PAIN_FUNCTIONAL_ASSESSMENT: 0-10

## 2024-08-05 ASSESSMENT — PAIN SCALES - GENERAL
PAINLEVEL_OUTOF10: 6
PAINLEVEL_OUTOF10: 4
PAINLEVEL_OUTOF10: 5 - MODERATE PAIN
PAINLEVEL_OUTOF10: 6
PAINLEVEL_OUTOF10: 0 - NO PAIN
PAINLEVEL_OUTOF10: 0 - NO PAIN
PAINLEVEL_OUTOF10: 4
PAINLEVEL_OUTOF10: 3
PAINLEVEL_OUTOF10: 2
PAINLEVEL_OUTOF10: 6
PAINLEVEL_OUTOF10: 7

## 2024-08-05 ASSESSMENT — PAIN DESCRIPTION - LOCATION
LOCATION: ABDOMEN

## 2024-08-05 NOTE — ANESTHESIA PREPROCEDURE EVALUATION
Patient: Uriel Gomez    Procedure Information       Date/Time: 08/05/24 0800    Procedure: Cholecystectomy Laparoscopy    [facility] / [facility]    Surgeons: Claudia Lee MD            Relevant Problems   Anesthesia (within normal limits)      Cardiac   (+) Primary hypertension      Liver   (+) Calculus of gallbladder with chronic cholecystitis without obstruction       Clinical information reviewed:   Tobacco  Allergies  Meds  Problems  Med Hx  Surg Hx   Fam Hx  Soc   Hx        NPO Detail:  NPO/Void Status  Date of Last Liquid: 08/05/24  Time of Last Liquid: 0100  Date of Last Solid: 08/04/24  Time of Last Solid: 2300  Time of Last Void: 0700         Physical Exam    Airway  Mallampati: III  TM distance: >3 FB     Cardiovascular    Dental    Pulmonary    Abdominal            Anesthesia Plan    History of general anesthesia?: yes  History of complications of general anesthesia?: no    ASA 3     general     intravenous induction   Anesthetic plan and risks discussed with patient.

## 2024-08-05 NOTE — OP NOTE
Cholecystectomy Laparoscopy Operative Note     Date: 2024  OR Location: [facility]    Name: Uriel Gomez : 2000, Age: 24 y.o., MRN: [id number], Sex: male    Diagnosis  Pre-op Diagnosis      * Calculus of gallbladder with chronic cholecystitis without obstruction [K80.10]     * Umbilical hernia with obstruction, without gangrene [K42.0] Post-op Diagnosis     * Calculus of gallbladder with chronic cholecystitis without obstruction [K80.10]     * Umbilical hernia with obstruction, without gangrene [K42.0]     Procedures  Cholecystectomy Laparoscopy  [address] - MT LAPAROSCOPY SURG CHOLECYSTECTOMY    MT REPAIR UMBILICAL JOHNATHAN,5+Y/O,REDUC [[id number]]  Surgeons      * Claudia Lee - Primary    Resident/Fellow/Other Assistant:  Surgeons and Role:  * No surgeons found with a matching role *    Procedure Summary  Anesthesia: General  ASA: III  Anesthesia Staff: Anesthesiologist: Jon Hall DO  CRNA: HELEN Everett-DINESH  Estimated Blood Loss: 20mL  Intra-op Medications:   Administrations occurring from 0800 to 0940 on 24:   Medication Name Total Dose   BUPivacaine HCl (Marcaine) 0.5 % (5 mg/mL) injection 5 mL   lidocaine-epinephrine (Xylocaine W/EPI) 1 %-1:100,000 injection 5 mL              Anesthesia Record               Intraprocedure I/O Totals          Intake    LR bolus 1000.00 mL    ceFAZolin 3 gram/100 mL 100.00 mL    Total Intake 1100 mL       Output    Est. Blood Loss 20 mL    NG/OG Tube Output 50 mL    Total Output 70 mL       Net    Net Volume 1030 mL          Specimen:   ID Type Source Tests Collected by Time   1 : gallbladder Tissue GALLBLADDER CHOLECYSTECTOMY SURGICAL PATHOLOGY EXAM Claudia Lee MD 2024 0909        Staff:   Circulator: Garry Prieto Person: Tolu         Drains and/or Catheters:   [REMOVED] NG/OG/Feeding Tube OG - Deshler sump 18 Fr (Removed)       Tourniquet Times:         Implants:     Findings: Patient with incarcerated umbilical hernia with fatty tissue.  Patient with  a gallbladder significantly distended with adhesions.  Very edematous.    Indications: Uriel Gomez is an 24 y.o. male who is having surgery for Calculus of gallbladder with chronic cholecystitis without obstruction [K80.10]  Umbilical hernia with obstruction, without gangrene [K42.0].     The patient was seen in the preoperative area. The risks, benefits, complications, treatment options, non-operative alternatives, expected recovery and outcomes were discussed with the patient. The possibilities of reaction to medication, pulmonary aspiration, injury to surrounding structures, bleeding, recurrent infection, the need for additional procedures, failure to diagnose a condition, and creating a complication requiring transfusion or operation were discussed with the patient. The patient concurred with the proposed plan, giving informed consent.  The site of surgery was properly noted/marked if necessary per policy. The patient has been actively warmed in preoperative area. Preoperative antibiotics have been ordered and given within 1 hours of incision. Venous thrombosis prophylaxis have been ordered including bilateral sequential compression devices    Procedure Details: Lap london. and primary umbilical hernia repair:  Patient was brought in the room laid in the supine position.  General anesthesia was obtained with ET intubation.  The abdomen was prepped and draped in a sterile fashion.  Marking pen was used to delineate the line of incision above the umbilicus.  Local was infiltrated into the tissues.  This was 1% lidocaine mixed with 25% Marcaine with epi.  A 15 blade scalpel used to make a 2 centimeter semicircular incision in the skin.  Underlying subcutaneous tissue  electrocautery down to the hernia. Fascia  edges were grasped between 2 Kocher clamps brought up through the incision and  Lynn clamp was used to enter the peritoneum two 0-Vicryl stay sutures were placed on the fascia edges,  a 10/ 12  balloon trocar is placed into the abdomen the abdomen was insufflated up to 15 millimeters of mercury.  Patient was then placed in the reverse Trendelenburg left-side down position.  Three  5 millimeter trocars were placed subcostally, 1 in the anterior axial line, 1 at the midclavicular line and 1 in the epigastrium.  This was done by 1st locally anesthetizing the skin followed by separation of the skin 1 centimeter with 11 blade.  All trocars were placed under direct vision.  Gallbladder grasped at the fundus and reflected upward. It was very edematous. Adhesions were taken down from the gallbladder body.  This facilitated grasping Alba's pouch.  Cystic duct was then skeletonized palpated to be soft 3 clips placed distally 1 proximally was transected.  Cystic artery was encountered 2 clips placed proximally 1 distally and was transected. Clips were placed with a 5 millimeter clip applier.  Gallbladder was then taken off the liver bed using electrocautery hook.  Due to the edematous nature of the gallbladder a small hole was made at the fundus where it was being grasped.  There was minimal bile spillage and no spillage of stones.  The endobag was brought to the umbilicus the gallbladder is placed in the endobag.  This was done by switching the camera to the 5 millimeter port site.  Camera was placed back into the 10 /12 Precious trocar.  Clips were re-examined and felt to be intact.  We had excellent hemostasis in the gallbladder fossa. Patient was then placed in the neutral position.  We did have smaller vessels along the gallbladder higher up in the gallbladder fossa that we used clips on as well.  Omentum was tucked into the gallbladder fossa.  All trocars were then removed under direct vision.  Gallbladder brought out through the umbilicus within the endobag.  CO2 was evacuated  from the abdominal cavity.  Operation continue with primary repair of incarcerated umbilical hernia.  It had incarcerated  preperitoneal fat.  Fascia at the umbilicus was closed using three 0 Vicryl figure-of-eight sutures, umbilicus was tacked back down to the fascia. skin incisions closed with 3-o and  4 0-Vicryl.  Steri-Strips and sterile dressings were applied. Patient tolerated  procedure well, extubated in the operating room transferred to recovery room with rails up. All counts were good .    Complications:  None; patient tolerated the procedure well.    Disposition: PACU - hemodynamically stable.  Condition: stable         Additional Details:     Attending Attestation: I was present and scrubbed for the entire procedure.    Claudia Lee  Phone Number: [phone]

## 2024-08-05 NOTE — ANESTHESIA PROCEDURE NOTES
Airway  Date/Time: 8/5/2024 8:26 AM  Urgency: elective    Airway not difficult    Staffing  Performed: CRNA   Authorized by: Jon Hall DO    Performed by: HELEN Everett-CRNA  Patient location during procedure: OR    Indications and Patient Condition  Indications for airway management: anesthesia  Spontaneous Ventilation: absent  Sedation level: deep  Preoxygenated: yes  Patient position: sniffing  MILS maintained throughout  Mask difficulty assessment: 2 - vent by mask + OA or adjuvant +/- NMBA    Final Airway Details  Final airway type: endotracheal airway      Successful airway: ETT  Cuffed: yes   Successful intubation technique: direct laryngoscopy  Facilitating devices/methods: intubating stylet  Endotracheal tube insertion site: oral  Blade type: glidescope.  Blade size: #4  ETT size (mm): 8.0  Cormack-Lehane Classification: grade I - full view of glottis  Placement verified by: chest auscultation and capnometry   Measured from: teeth  ETT to teeth (cm): 23  Number of attempts at approach: 1           None

## 2024-08-05 NOTE — ANESTHESIA POSTPROCEDURE EVALUATION
Patient: Uriel Gomez    Procedure Summary       Date: 08/05/24 Room / [facility]: [facility] / [facility]    Anesthesia Start: 0811 Anesthesia Stop: 1008    Procedure: Cholecystectomy Laparoscopy Diagnosis:       Calculus of gallbladder with chronic cholecystitis without obstruction      Umbilical hernia with obstruction, without gangrene      (Calculus of gallbladder with chronic cholecystitis without obstruction [K80.10])      (Umbilical hernia with obstruction, without gangrene [K42.0])    Surgeons: Claudia Lee MD Responsible Provider: Jon Hall DO    Anesthesia Type: general ASA Status: 3            Anesthesia Type: general    Vitals Value Taken Time   /61 08/05/24 1146   Temp 36.2 °C (97.2 °F) 08/05/24 1140   Pulse 77 08/05/24 1150   Resp 18 08/05/24 1150   SpO2 95 % 08/05/24 1150   Vitals shown include unfiled device data.    Anesthesia Post Evaluation    Patient location during evaluation: bedside  Patient participation: complete - patient participated  Level of consciousness: awake  Pain management: adequate  Airway patency: patent  Cardiovascular status: acceptable  Respiratory status: acceptable  Hydration status: acceptable  Postoperative Nausea and Vomiting: none        There were no known notable events for this encounter.

## 2024-08-07 ENCOUNTER — PATIENT MESSAGE (OUTPATIENT)
Dept: PRIMARY CARE | Facility: CLINIC | Age: 24
End: 2024-08-07
Payer: COMMERCIAL

## 2024-08-07 DIAGNOSIS — K80.12 CALCULUS OF GALLBLADDER WITH ACUTE ON CHRONIC CHOLECYSTITIS WITHOUT OBSTRUCTION: ICD-10-CM

## 2024-08-08 DIAGNOSIS — K80.12 CALCULUS OF GALLBLADDER WITH ACUTE ON CHRONIC CHOLECYSTITIS WITHOUT OBSTRUCTION: ICD-10-CM

## 2024-08-08 LAB
LABORATORY COMMENT REPORT: NORMAL
PATH REPORT.FINAL DX SPEC: NORMAL
PATH REPORT.GROSS SPEC: NORMAL
PATH REPORT.RELEVANT HX SPEC: NORMAL
PATH REPORT.TOTAL CANCER: NORMAL

## 2024-08-08 RX ORDER — PANTOPRAZOLE SODIUM 40 MG/1
40 TABLET, DELAYED RELEASE ORAL
Qty: 30 TABLET | Refills: 0 | Status: SHIPPED | OUTPATIENT
Start: 2024-08-08 | End: 2025-08-08

## 2024-08-08 RX ORDER — PANTOPRAZOLE SODIUM 40 MG/1
40 TABLET, DELAYED RELEASE ORAL
Qty: 30 TABLET | Refills: 0 | OUTPATIENT
Start: 2024-08-08 | End: 2025-08-08

## 2024-08-22 NOTE — PROGRESS NOTES
"Subjective   Patient ID: Uriel Gomez is a 24 y.o. male who presents for post operative visit.  HPI  Patient returns to clinic for S/P Laparoscopic Cholecystectomy 8/5/2024.  Patient also with incarcerated local hernia.  Gallbladder was distended with adhesions and very edematous.  Patient did indicate when he went back to work couple weeks ago he bent over and felt a popping sensation.  Has not had any pain since then.    FINAL DIAGNOSIS   A. GALLBLADDER, CHOLECYSTECTOMY:   -- Chronic cholecystitis and cholelithiasis with cholesterolosis.     Social History:  Tobacco Use - NEVER  Do you use any recreational drugs - NEVER  Alcohol Use - NEVER  Caffeine Intake - 1-2 sodas daily  Working - FULL TIME  Marital status - SINGLE  Living with - mom,sisters,grandfather    There were no vitals filed for this visit.    No Known Allergies    Past Medical History:   Diagnosis Date    Asthma (HHS-HCC)     Cholelithiasis     GERD (gastroesophageal reflux disease)     H/O pericarditis 2012    after viral illness    Hypertension     Obesity     Sleep apnea     Vitiligo      Past Surgical History:   Procedure Laterality Date    ADENOIDECTOMY      TONSILLECTOMY       Family History   Problem Relation Name Age of Onset    Mental illness Mother      Hypertension Mother      Diabetes Mother      Cancer Mother      Hypertension Father         Review of Systems  /89   Pulse 94   Temp 36.7 °C (98.1 °F)   Resp 17   Ht 2.007 m (6' 7\")   Wt (!) 161 kg (355 lb)   SpO2 94%   BMI 39.99 kg/m²     Objective   Physical Exam  All incisions clean dry and intact.  Assessment/Plan   Problem List Items Addressed This Visit             ICD-10-CM    Calculus of gallbladder with chronic cholecystitis without obstruction - Primary K80.10     Status post lap london with excellent healing.  Pathology with chronic cholecystitis and cholelithiasis.  Patient follows up as needed         Umbilical hernia with obstruction, without gangrene K42.0     " Patient with very small incarcerated vocal hernia repaired primarily intraoperatively with lico callahan.                 Claudia Lee MD 08/26/24 11:24 AM

## 2024-08-26 ENCOUNTER — OFFICE VISIT (OUTPATIENT)
Dept: SURGERY | Facility: CLINIC | Age: 24
End: 2024-08-26
Payer: COMMERCIAL

## 2024-08-26 VITALS
OXYGEN SATURATION: 94 % | DIASTOLIC BLOOD PRESSURE: 89 MMHG | RESPIRATION RATE: 17 BRPM | BODY MASS INDEX: 36.45 KG/M2 | TEMPERATURE: 98.1 F | HEIGHT: 78 IN | WEIGHT: 315 LBS | SYSTOLIC BLOOD PRESSURE: 147 MMHG | HEART RATE: 94 BPM

## 2024-08-26 DIAGNOSIS — K42.0 UMBILICAL HERNIA WITH OBSTRUCTION, WITHOUT GANGRENE: ICD-10-CM

## 2024-08-26 DIAGNOSIS — K80.10 CALCULUS OF GALLBLADDER WITH CHRONIC CHOLECYSTITIS WITHOUT OBSTRUCTION: Primary | ICD-10-CM

## 2024-08-26 PROCEDURE — 3077F SYST BP >= 140 MM HG: CPT | Performed by: SURGERY

## 2024-08-26 PROCEDURE — 99211 OFF/OP EST MAY X REQ PHY/QHP: CPT | Performed by: SURGERY

## 2024-08-26 PROCEDURE — 1036F TOBACCO NON-USER: CPT | Performed by: SURGERY

## 2024-08-26 PROCEDURE — 3008F BODY MASS INDEX DOCD: CPT | Performed by: SURGERY

## 2024-08-26 PROCEDURE — 3079F DIAST BP 80-89 MM HG: CPT | Performed by: SURGERY

## 2024-08-26 ASSESSMENT — PATIENT HEALTH QUESTIONNAIRE - PHQ9
2. FEELING DOWN, DEPRESSED OR HOPELESS: NOT AT ALL
1. LITTLE INTEREST OR PLEASURE IN DOING THINGS: NOT AT ALL
SUM OF ALL RESPONSES TO PHQ9 QUESTIONS 1 & 2: 0

## 2024-08-26 ASSESSMENT — LIFESTYLE VARIABLES
SKIP TO QUESTIONS 9-10: 1
HOW MANY STANDARD DRINKS CONTAINING ALCOHOL DO YOU HAVE ON A TYPICAL DAY: PATIENT DOES NOT DRINK
HOW OFTEN DO YOU HAVE A DRINK CONTAINING ALCOHOL: NEVER
AUDIT-C TOTAL SCORE: 0
HOW OFTEN DO YOU HAVE SIX OR MORE DRINKS ON ONE OCCASION: NEVER

## 2024-08-26 ASSESSMENT — ENCOUNTER SYMPTOMS
LOSS OF SENSATION IN FEET: 0
DEPRESSION: 0
OCCASIONAL FEELINGS OF UNSTEADINESS: 0

## 2024-08-26 ASSESSMENT — PAIN SCALES - GENERAL: PAINLEVEL: 0-NO PAIN

## 2024-08-26 NOTE — ASSESSMENT & PLAN NOTE
Status post lap london with excellent healing.  Pathology with chronic cholecystitis and cholelithiasis.  Patient follows up as needed

## 2024-08-26 NOTE — ASSESSMENT & PLAN NOTE
Patient with very small incarcerated vocal hernia repaired primarily intraoperatively with lap london.

## 2024-09-04 DIAGNOSIS — K80.12 CALCULUS OF GALLBLADDER WITH ACUTE ON CHRONIC CHOLECYSTITIS WITHOUT OBSTRUCTION: ICD-10-CM

## 2024-09-04 RX ORDER — PANTOPRAZOLE SODIUM 40 MG/1
40 TABLET, DELAYED RELEASE ORAL
Qty: 90 TABLET | Refills: 1 | Status: SHIPPED | OUTPATIENT
Start: 2024-09-04 | End: 2025-09-04

## 2024-09-16 ENCOUNTER — OFFICE VISIT (OUTPATIENT)
Dept: URGENT CARE | Facility: URGENT CARE | Age: 24
End: 2024-09-16
Payer: COMMERCIAL

## 2024-09-16 VITALS
HEART RATE: 85 BPM | RESPIRATION RATE: 18 BRPM | DIASTOLIC BLOOD PRESSURE: 79 MMHG | BODY MASS INDEX: 40.18 KG/M2 | OXYGEN SATURATION: 96 % | TEMPERATURE: 98.4 F | SYSTOLIC BLOOD PRESSURE: 118 MMHG | WEIGHT: 315 LBS

## 2024-09-16 DIAGNOSIS — T63.461A YELLOW JACKET STING, ACCIDENTAL OR UNINTENTIONAL, INITIAL ENCOUNTER: Primary | ICD-10-CM

## 2024-09-16 DIAGNOSIS — L03.311 CELLULITIS OF RIGHT ABDOMINAL WALL: ICD-10-CM

## 2024-09-16 RX ORDER — METHYLPREDNISOLONE 4 MG/1
TABLET ORAL
Qty: 21 TABLET | Refills: 0 | Status: SHIPPED | OUTPATIENT
Start: 2024-09-16

## 2024-09-16 RX ORDER — CEPHALEXIN 500 MG/1
500 CAPSULE ORAL 3 TIMES DAILY
Qty: 15 CAPSULE | Refills: 0 | Status: SHIPPED | OUTPATIENT
Start: 2024-09-16 | End: 2024-09-21

## 2024-09-16 NOTE — PATIENT INSTRUCTIONS
You have a yellowjacket sting to your right abdomen.  Please increase your oral fluids for the next 7-10 days  Please take Keflex as prescribed  Please take Medrol as a single dose early in the day with food  Please take Pepcid 20 mg once or twice daily for the next 5 days  May use Zyrtec or Allegra for additional relief of itching and swelling  May apply cold compress to affected area to help reduce swelling and discomfort  If fever greater than 102 degrees F, chills, nausea, vomiting, increased redness, increased pain, induration, drainage, crusting, purulent discharge please go to emergency department for further evaluation of this problem  This note was generated by voice recognition software. Minor transcription/grammatical errors may be present. Please call for clarification.

## 2024-09-16 NOTE — PROGRESS NOTES
Subjective   Patient ID: Uriel Gomez is a 24 y.o. male.    24-year-old male presents with reddened warm area on the right abdomen x 1 day.  States yesterday that he was stung by an insect.          The following portions of the chart were reviewed this encounter and updated as appropriate:  Tobacco  Allergies  Meds  Problems  Med Hx  Surg Hx  Fam Hx         Review of Systems   Constitutional:  Negative for chills and fever.   HENT:  Negative for congestion, ear pain, rhinorrhea and sore throat.    Respiratory:  Negative for cough, chest tightness, shortness of breath and wheezing.    Cardiovascular:  Negative for palpitations.   Gastrointestinal:  Negative for constipation, diarrhea, nausea and vomiting.   Genitourinary:  Negative for dysuria and frequency.   Skin:  Positive for color change and wound.   Neurological:  Negative for facial asymmetry.     Objective   Physical Exam  Vital signs are reviewed. Alert and oriented x3 with normal mood and affect  Patient is well nourished, well-developed, alert and in no acute distress    External eyes, orbits, conjunctiva and eyelids are normal in appearance  Pupils are equal, round, reactive to light and accommodation, extraocular movements intact    External ears appear normal  External canals are normal in appearance  Right tympanic membrane is intact and pale gray in appearance  Left tympanic membrane is intact and pale gray in appearance  There is no middle ear effusion noted on the right  There is no middle ear effusion noted on the left  External appearance of the nose is normal  Nasal mucosa, septum, turbinates are pink in appearance  There is no nasal discharge in both nares    Oral mucosa is uniformly pink and moist  Palate is pink, symmetric and intact  Tongue is moist, mobile and midline  Posterior pharynx not erythematous with no concretions or exudates present  No cervical lymphadenopathy palpated    Heart has regular rate and rhythm. No murmurs, rubs  or gallops are auscultated at this exam.    Respiratory rate rhythm and effort are normal. Breath sounds bilaterally are clear on auscultation without crackles, rhonchi, wheezes or friction rub.    Abdomen: Normal bowel sounds on auscultation. Soft, nontender without rebound or rigidity on palpation.  There is a reddened area approximately 10 x 15 cm on the right lower abdominal skin with central area of redness compatible with insect sting.  It is warm to the touch and there is obvious inflammation.  Procedures    Assessment/Plan   Diagnoses and all orders for this visit:  Yellow jacket sting, accidental or unintentional, initial encounter  -     methylPREDNISolone (Medrol Dospak) 4 mg tablets; Please take one row of tabs early in the day with food each day  Cellulitis of right abdominal wall  -     cephalexin (Keflex) 500 mg capsule; Take 1 capsule (500 mg) by mouth 3 times a day for 5 days.    Patient disposition: Home

## 2024-09-18 ASSESSMENT — ENCOUNTER SYMPTOMS
WHEEZING: 0
RHINORRHEA: 0
SORE THROAT: 0
COUGH: 0
DYSURIA: 0
FEVER: 0
WOUND: 1
FACIAL ASYMMETRY: 0
PALPITATIONS: 0
COLOR CHANGE: 1
CHEST TIGHTNESS: 0
NAUSEA: 0
VOMITING: 0
CHILLS: 0
DIARRHEA: 0
CONSTIPATION: 0
SHORTNESS OF BREATH: 0
FREQUENCY: 0

## 2025-01-19 ENCOUNTER — OFFICE VISIT (OUTPATIENT)
Dept: URGENT CARE | Age: 25
End: 2025-01-19
Payer: COMMERCIAL

## 2025-01-19 VITALS
DIASTOLIC BLOOD PRESSURE: 88 MMHG | HEART RATE: 78 BPM | OXYGEN SATURATION: 97 % | HEIGHT: 78 IN | TEMPERATURE: 98.3 F | BODY MASS INDEX: 36.45 KG/M2 | RESPIRATION RATE: 16 BRPM | SYSTOLIC BLOOD PRESSURE: 130 MMHG | WEIGHT: 315 LBS

## 2025-01-19 DIAGNOSIS — K04.7 DENTAL INFECTION: Primary | ICD-10-CM

## 2025-01-19 PROCEDURE — 99213 OFFICE O/P EST LOW 20 MIN: CPT

## 2025-01-19 PROCEDURE — 3079F DIAST BP 80-89 MM HG: CPT

## 2025-01-19 PROCEDURE — 1036F TOBACCO NON-USER: CPT

## 2025-01-19 PROCEDURE — 3075F SYST BP GE 130 - 139MM HG: CPT

## 2025-01-19 PROCEDURE — 3008F BODY MASS INDEX DOCD: CPT

## 2025-01-19 RX ORDER — AMOXICILLIN 875 MG/1
875 TABLET, FILM COATED ORAL 2 TIMES DAILY
Qty: 14 TABLET | Refills: 0 | Status: SHIPPED | OUTPATIENT
Start: 2025-01-19 | End: 2025-01-26

## 2025-01-19 ASSESSMENT — ENCOUNTER SYMPTOMS
CHILLS: 0
SORE THROAT: 0
FATIGUE: 0
ABDOMINAL PAIN: 0
MYALGIAS: 0
COUGH: 0
UNEXPECTED WEIGHT CHANGE: 0
VOMITING: 0
DIARRHEA: 0
FEVER: 0
SHORTNESS OF BREATH: 0

## 2025-01-19 NOTE — PROGRESS NOTES
Subjective   Patient ID: Uriel Gomez is a 24 y.o. male. They present today with a chief complaint of Dental Pain (x3days).    History of Present Illness  Patient is a 24-year-old male presenting to the clinic with complaints of tooth pain.  Patient states he has tooth pain over the right lower most rear molar.  Patient states pain has been present for 2 to 3 days now.  Patient states pain has worsened over the last day.  Patient states he called his dental emergency line who recommended he come to the urgent care to get an antibiotic.  They recommended this because they were closed.  Patient states the pain is dull achy only over 1 specific tooth.  Right lower most rear molar.  Patient states pain is worsened to palpation and grinding of his teeth.  Patient denies any abscesses, fever, chills, sweats.  No other acute ROS.      History provided by:  Patient  Dental Pain  Associated symptoms: no congestion and no fever        Past Medical History  Allergies as of 01/19/2025    (No Known Allergies)       (Not in a hospital admission)       Past Medical History:   Diagnosis Date    Adverse effect of anesthesia 05/7    Given fentanol and o2 dropped    Asthma     Cholelithiasis     GERD (gastroesophageal reflux disease)     H/O pericarditis 2012    after viral illness    Hypertension     Obesity     Sleep apnea     Vitiligo        Past Surgical History:   Procedure Laterality Date    ADENOIDECTOMY      CHOLECYSTECTOMY N/A 08/05/2024    laparoscopic    TONSILLECTOMY          reports that he has never smoked. He has never been exposed to tobacco smoke. He has never used smokeless tobacco. He reports that he does not currently use alcohol. He reports that he does not currently use drugs.    Review of Systems  Review of Systems   Constitutional:  Negative for chills, fatigue, fever and unexpected weight change.   HENT:  Positive for dental problem. Negative for congestion and sore throat.    Respiratory:  Negative for cough  "and shortness of breath.    Cardiovascular:  Negative for chest pain.   Gastrointestinal:  Negative for abdominal pain, diarrhea and vomiting.   Musculoskeletal:  Negative for myalgias.   Skin:  Negative for rash.                                  Objective    Vitals:    01/19/25 0849 01/19/25 0911   BP: (!) 142/92 130/88   BP Location: Left arm Left arm   Patient Position: Sitting Sitting   BP Cuff Size: Large adult long    Pulse: 78    Resp: 16    TempSrc: Oral    SpO2: 97%    Weight: (!) 163 kg (360 lb)    Height: 2.057 m (6' 9\")      No LMP for male patient.    Physical Exam  Vitals reviewed.   Constitutional:       General: He is not in acute distress.     Appearance: Normal appearance. He is not ill-appearing, toxic-appearing or diaphoretic.   HENT:      Head: Normocephalic and atraumatic.      Mouth/Throat:      Mouth: Mucous membranes are moist.      Pharynx: Oropharynx is clear. Uvula midline. No oropharyngeal exudate or posterior oropharyngeal erythema.      Tonsils: No tonsillar exudate or tonsillar abscesses.      Comments: Patient's tooth in question right lower most rear molar no signs of visible abnormalities.  No signs of abscesses.  No significant visible dental decay. No gingival hypertrophy  Cardiovascular:      Rate and Rhythm: Normal rate and regular rhythm.      Heart sounds: Normal heart sounds. No murmur heard.     No gallop.   Pulmonary:      Effort: Pulmonary effort is normal. No respiratory distress.      Breath sounds: Normal breath sounds. No stridor. No wheezing, rhonchi or rales.   Neurological:      General: No focal deficit present.      Mental Status: He is alert and oriented to person, place, and time. Mental status is at baseline.   Psychiatric:         Mood and Affect: Mood normal.         Behavior: Behavior normal.         Procedures    Point of Care Test & Imaging Results from this visit  No results found for this visit on 01/19/25.   No results found.    Diagnostic study " results (if any) were reviewed by [facility].    Assessment/Plan   Allergies, medications, history, and pertinent labs/EKGs/Imaging reviewed by Daniel Dozier PA-C.     Medical Decision Making:    Patient is a 24-year-old male presenting to the clinic with complaints of tooth pain.  Patient states he has tooth pain over the right lower most rear molar.  Patient states pain has been present for 2 to 3 days now.  Patient states pain has worsened over the last day.  Patient states he called his dental emergency line who recommended he come to the urgent care to get an antibiotic.  They recommended this because they were closed.  Patient states the pain is dull achy only over 1 specific tooth.  Right lower most rear molar.  Patient states pain is worsened to palpation and grinding of his teeth.  Patient denies any abscesses, fever, chills, sweats.  No other acute ROS. Patient's tooth in question right lower most rear molar no signs of visible abnormalities.  No signs of abscesses.  No significant visible dental decay.  Vital signs in the clinic are stable.  Physical examination as above.  Patient will be covered with amoxicillin follow-up with a dentist. Discharge instructions. Please follow up with your dentist as soon as possible.  Preferably within the next 3 to 7 days. If you develop any fever, chills, sweats, dental abscesses, intense uncontrollable pain, worsening symptoms  please go to the emergency room for reevaluation. It is important to take prescriptions as prescribed and complete all antibiotics. If your symptoms worsen you are instructed to immediately go to the emergency room for reevaluation and further assessment. If you develop any chest pain, SOB, or difficulty breathing you are instructed to go to the emergency room for reevaluation. All discharge instructions will be provided and explained to the patient at discharge. If you have any questions regarding your treatment plan please call the  [facility] urgent care clinic.     Orders and Diagnoses  Diagnoses and all orders for this visit:  Dental infection  -     amoxicillin (Amoxil) 875 mg tablet; Take 1 tablet (875 mg) by mouth 2 times a day for 7 days.      Medical Admin Record      Patient disposition: Home    Electronically signed by [facility]  9:16 AM

## 2025-01-19 NOTE — LETTER
January 19, 2025     Patient: Uriel Gomez   YOB: 2000   Date of Visit: 1/19/2025       To Whom It May Concern:    Uriel Gomez was seen in my clinic on 1/19/2025 at 8:45 am. Please excuse Uriel for his absence from work on this day to make the appointment.  He can return 1/20/25.    If you have any questions or concerns, please don't hesitate to call.         Sincerely,         [facility]        CC: No Recipients

## 2025-01-19 NOTE — PATIENT INSTRUCTIONS
Discharge instructions    Please follow up with your dentist as soon as possible.  Preferably within the next 3 to 7 days.    If you develop any fever, chills, sweats, dental abscesses, intense uncontrollable pain, worsening symptoms  please go to the emergency room for reevaluation.    It is important to take prescriptions as prescribed and complete all antibiotics.     If your symptoms worsen you are instructed to immediately go to the emergency room for reevaluation and further assessment.    If you develop any chest pain, SOB, or difficulty breathing you are instructed to go to the emergency room for reevaluation.    All discharge instructions will be provided and explained to the patient at discharge.    If you have any questions regarding your treatment plan please call the [facility] urgent care clinic.

## 2025-05-12 ENCOUNTER — OFFICE VISIT (OUTPATIENT)
Dept: URGENT CARE | Age: 25
End: 2025-05-12
Payer: COMMERCIAL

## 2025-05-12 ENCOUNTER — ANCILLARY PROCEDURE (OUTPATIENT)
Dept: URGENT CARE | Age: 25
End: 2025-05-12
Payer: COMMERCIAL

## 2025-05-12 VITALS
RESPIRATION RATE: 16 BRPM | SYSTOLIC BLOOD PRESSURE: 146 MMHG | DIASTOLIC BLOOD PRESSURE: 95 MMHG | BODY MASS INDEX: 36.45 KG/M2 | OXYGEN SATURATION: 97 % | HEIGHT: 78 IN | HEART RATE: 103 BPM | TEMPERATURE: 98.6 F | WEIGHT: 315 LBS

## 2025-05-12 DIAGNOSIS — R06.02 SOB (SHORTNESS OF BREATH): ICD-10-CM

## 2025-05-12 DIAGNOSIS — B34.8 RHINOVIRUS: ICD-10-CM

## 2025-05-12 DIAGNOSIS — J02.9 SORE THROAT: Primary | ICD-10-CM

## 2025-05-12 DIAGNOSIS — J18.9 PNEUMONIA OF BOTH LOWER LOBES DUE TO INFECTIOUS ORGANISM: ICD-10-CM

## 2025-05-12 LAB
POC HUMAN RHINOVIRUS PCR: POSITIVE
POC INFLUENZA A VIRUS PCR: NEGATIVE
POC INFLUENZA B VIRUS PCR: NEGATIVE
POC RESPIRATORY SYNCYTIAL VIRUS PCR: NEGATIVE
POC STREPTOCOCCUS PYOGENES (GROUP A STREP) PCR: NEGATIVE

## 2025-05-12 PROCEDURE — 71046 X-RAY EXAM CHEST 2 VIEWS: CPT

## 2025-05-12 RX ORDER — AZITHROMYCIN 250 MG/1
TABLET, FILM COATED ORAL
Qty: 6 TABLET | Refills: 0 | Status: SHIPPED | OUTPATIENT
Start: 2025-05-12

## 2025-05-12 RX ORDER — ALBUTEROL SULFATE 90 UG/1
2 INHALANT RESPIRATORY (INHALATION) EVERY 4 HOURS PRN
Qty: 8.5 G | Refills: 0 | Status: SHIPPED | OUTPATIENT
Start: 2025-05-12 | End: 2026-05-12

## 2025-05-12 RX ORDER — AMOXICILLIN AND CLAVULANATE POTASSIUM 875; 125 MG/1; MG/1
875 TABLET, FILM COATED ORAL 2 TIMES DAILY
Qty: 20 TABLET | Refills: 0 | Status: SHIPPED | OUTPATIENT
Start: 2025-05-12 | End: 2025-05-22

## 2025-05-12 RX ORDER — ACETAMINOPHEN 500 MG
1000 TABLET ORAL ONCE
Status: COMPLETED | OUTPATIENT
Start: 2025-05-12 | End: 2025-05-12

## 2025-05-12 RX ORDER — IPRATROPIUM BROMIDE AND ALBUTEROL SULFATE 2.5; .5 MG/3ML; MG/3ML
3 SOLUTION RESPIRATORY (INHALATION) ONCE
Status: COMPLETED | OUTPATIENT
Start: 2025-05-12 | End: 2025-05-12

## 2025-05-12 RX ADMIN — IPRATROPIUM BROMIDE AND ALBUTEROL SULFATE 3 ML: 2.5; .5 SOLUTION RESPIRATORY (INHALATION) at 16:19

## 2025-05-12 RX ADMIN — Medication 1000 MG: at 14:22

## 2025-05-12 ASSESSMENT — PATIENT HEALTH QUESTIONNAIRE - PHQ9
SUM OF ALL RESPONSES TO PHQ9 QUESTIONS 1 & 2: 0
2. FEELING DOWN, DEPRESSED OR HOPELESS: NOT AT ALL
1. LITTLE INTEREST OR PLEASURE IN DOING THINGS: NOT AT ALL

## 2025-05-12 ASSESSMENT — ENCOUNTER SYMPTOMS: SHORTNESS OF BREATH: 1

## 2025-05-12 NOTE — PATIENT INSTRUCTIONS
Take medications as prescribed.  Maintain adequate hydration and nutrition.  If your symptoms worsen or you develop worsening shortness of breath/fevers or develop chest pain please call 911 or go to the emergency department immediately.  Call PCP today to schedule follow-up appointment for this week.

## 2025-05-12 NOTE — PROGRESS NOTES
"Subjective   Patient ID: Uriel Gomez is a 24 y.o. male. They present today with a chief complaint of Laryngitis, Shortness of Breath (X 2 days), and Chest Pain (Chest tightness).    History of Present Illness  24-year-old male presents to urgent care for productive cough x 3 days and chest tightness/shortness of breath worse with exertion for past 2 days.  States he is also having sweats and feeling feverish.  Denies any radiation of pain, focal deficits/numbness or weakness on one side of his body, facial droop or difficulty speaking or swallowing.  Denies palpitations, leg swelling or calf pain.  Denies history of blood clots, recent long travel or surgery.  Denies tobacco use or illicit drug use.  Denies significant cardiac history.  States has a history of childhood asthma.  Last needed inhaler was 17 years old when he was doing strenuous exercise.  States she has had pneumonia before when he was 13 years old and was hospitalized for 3 days.  States he is a  and is around sick contacts.  Patient states he has concern for pneumonia or RSV.  EKG shows sinus tachycardia heart rate 111 bpm,  ms,  ms,  ms, QRS axis -46 degrees, no obvious ST elevation or depression.  Radiologist impression of the chest x-ray states \"Patchy airspace disease bilaterally at the bases concerning for  atypical/viral pneumonia. This is more pronounced on the left.  Radiographic follow-up to resolution in 2-3 weeks is recommended.\".  1 DuoNeb treatment in the urgent care today improved symptoms significantly.  Also give Tylenol and water.  Discussed patient with supervising physician Dr. Escobar and plan is to prescribe Augmentin, azithromycin, and albuterol inhaler.  Patient is to call his primary care provider today to schedule appointment for this week and will need follow-up chest x-ray in 3 weeks.  If patient has continuous shortness of breath or worsening shortness of breath or develops any chest pain " "uncontrollable fevers or any other concerning symptoms he is to call 911 or go directly to the emergency department.  Patient expresses understanding and agrees with plan.        Shortness of Breath  Associated symptoms: chest pain    Chest Pain  Associated symptoms: shortness of breath        Past Medical History  Allergies as of 05/12/2025    (No Known Allergies)       Prescriptions Prior to Admission[1]     Medical History[2]    Surgical History[3]     reports that he has never smoked. He has never been exposed to tobacco smoke. He has never used smokeless tobacco. He reports that he does not currently use alcohol. He reports that he does not currently use drugs.    Review of Systems  Review of Systems   Respiratory:  Positive for shortness of breath.    Cardiovascular:  Positive for chest pain.   All other systems reviewed and are negative.                                 Objective    Vitals:    05/12/25 1348   BP: (!) 146/95   Pulse: 103   Resp: 16   Temp: 37 °C (98.6 °F)   TempSrc: Oral   SpO2: 97%   Weight: (!) 170 kg (375 lb)   Height: 1.981 m (6' 6\")     No LMP for male patient.    Physical Exam  Vitals reviewed.   Constitutional:       General: He is not in acute distress.     Appearance: Normal appearance. He is diaphoretic. He is not ill-appearing or toxic-appearing.   HENT:      Head: Normocephalic and atraumatic.      Nose: Congestion and rhinorrhea present.      Mouth/Throat:      Mouth: Mucous membranes are moist.      Pharynx: Oropharynx is clear.      Comments: Pharynx erythematous without exudate, uvula midline, airway clear.  Cardiovascular:      Rate and Rhythm: Regular rhythm. Tachycardia present.   Pulmonary:      Effort: Pulmonary effort is normal. No respiratory distress.      Breath sounds: Normal breath sounds. No stridor. No wheezing, rhonchi or rales.   Abdominal:      General: Abdomen is flat.      Palpations: Abdomen is soft.      Tenderness: There is no abdominal tenderness. There is " no right CVA tenderness or left CVA tenderness.   Musculoskeletal:      Cervical back: Normal range of motion and neck supple. No rigidity or tenderness.      Right lower leg: No edema.      Left lower leg: No edema.      Comments: No calf tenderness   Lymphadenopathy:      Cervical: Cervical adenopathy present.   Skin:     General: Skin is warm.   Neurological:      General: No focal deficit present.      Mental Status: He is alert and oriented to person, place, and time.      Motor: No weakness.      Gait: Gait normal.   Psychiatric:         Mood and Affect: Mood normal.         Behavior: Behavior normal.         Procedures    Point of Care Test & Imaging Results from this visit  No results found for this visit on 05/12/25.   Imaging  No results found.    Cardiology, Vascular, and Other Imaging  No other imaging results found for the past 2 days      Diagnostic study results (if any) were reviewed by Bella Orozco PA-C.    Assessment/Plan   Allergies, medications, history, and pertinent labs/EKGs/Imaging reviewed by Bella Orozco PA-C.     Medical Decision Making  24-year-old male presents to urgent care for productive cough x 3 days and chest tightness/shortness of breath worse with exertion for past 2 days.  States he is also having sweats and feeling feverish.  Denies any radiation of pain, focal deficits/numbness or weakness on one side of his body, facial droop or difficulty speaking or swallowing.  Denies palpitations, leg swelling or calf pain.  Denies history of blood clots, recent long travel or surgery.  Denies tobacco use or illicit drug use.  Denies significant cardiac history.  States has a history of childhood asthma.  Last needed inhaler was 17 years old when he was doing strenuous exercise.  States she has had pneumonia before when he was 13 years old and was hospitalized for 3 days.  States he is a  and is around sick contacts.  Patient states he has concern for pneumonia  "or RSV.  EKG shows sinus tachycardia heart rate 111 bpm,  ms,  ms,  ms, QRS axis -46 degrees, no obvious ST elevation or depression.  Radiologist impression of the chest x-ray states \"Patchy airspace disease bilaterally at the bases concerning for  atypical/viral pneumonia. This is more pronounced on the left.  Radiographic follow-up to resolution in 2-3 weeks is recommended.\".  1 DuoNeb treatment in the urgent care today improved symptoms significantly.  Also give Tylenol and water.  Discussed patient with supervising physician Dr. Escobar and plan is to prescribe Augmentin, azithromycin, and albuterol inhaler.  Patient is to call his primary care provider today to schedule appointment for this week and will need follow-up chest x-ray in 3 weeks.  If patient has continuous shortness of breath or worsening shortness of breath or develops any chest pain uncontrollable fevers or any other concerning symptoms he is to call 911 or go directly to the emergency department.  Patient expresses understanding and agrees with plan.    Orders and Diagnoses  Diagnoses and all orders for this visit:  SOB (shortness of breath)  -     XR chest 2 views; Future  -     ECG 12 Lead      Medical Admin Record      Patient disposition: Home    Electronically signed by Bella Orozco PA-C  1:59 PM           [1] (Not in a hospital admission)  [2]   Past Medical History:  Diagnosis Date    Adverse effect of anesthesia 05/7    Given fentanol and o2 dropped    Asthma     Cholelithiasis     GERD (gastroesophageal reflux disease)     H/O pericarditis 2012    after viral illness    Hypertension     Obesity     Sleep apnea     Vitiligo    [3]   Past Surgical History:  Procedure Laterality Date    ADENOIDECTOMY      CHOLECYSTECTOMY N/A 08/05/2024    laparoscopic    TONSILLECTOMY       "

## 2025-05-12 NOTE — LETTER
May 12, 2025     Patient: Uriel Gomez   YOB: 2000   Date of Visit: 5/12/2025       To Whom It May Concern:    Uriel Gomez was seen in my clinic on 5/12/2025 at 1:45 pm. Please excuse Uriel for his absence from work on this day to make the appointment.  Can return to work on 5/15/2025 Thursday if fever free for 24 hours without fever reducing medications and has improving symptoms.    If you have any questions or concerns, please don't hesitate to call.         Sincerely,         Bella Orozco PA-C        CC: No Recipients

## 2025-05-13 ENCOUNTER — TELEPHONE (OUTPATIENT)
Dept: URGENT CARE | Age: 25
End: 2025-05-13

## 2025-05-13 NOTE — TELEPHONE ENCOUNTER
PT seen on 05/12/2025. Next day courtesy call. PT states she is feeling better after taking two doses of her medication. PT did not have any questions or concerns.

## (undated) DEVICE — RETRIEVAL SYSTEM, MONARCH, 10MM DISP ENDOSCOPIC

## (undated) DEVICE — BLADE, SURGICAL, POLYMER COATED P15, STERILE, DISP

## (undated) DEVICE — TUBE SET, PNEUMOCLEAR, SMOKE EVACU, HIGH-FLOW

## (undated) DEVICE — GLOVE, SURGICAL, PROTEXIS PI , 6.5, PF, LF

## (undated) DEVICE — DRAPE, SHEET, UTILITY, NON ABSORBENT, 18 X 26 IN, LF

## (undated) DEVICE — CARE KIT, LAPAROSCOPIC, ADVANCED

## (undated) DEVICE — SUTURE, PDS, 0, 18 IN, LIGATING LOOP, VIOLET

## (undated) DEVICE — SUTURE, VICRYL, 4-0, 18 IN, UNDYED BR PS-2

## (undated) DEVICE — SUTURE, VICRYL, 3-0, 27 IN, SH, VIOLET

## (undated) DEVICE — TROCAR SYSTEM, BALLOON, KII GELPORT, 12 X 100MM

## (undated) DEVICE — STRIP, SKIN CLOSURE, STERI STRIP, REINFORCED, 0.5 X 4 IN

## (undated) DEVICE — Device

## (undated) DEVICE — SOLUTION, IRRIGATION, X RX SODIUM CHL 0.9%, 1000ML BTL

## (undated) DEVICE — SUTURE, VICRYL, 0, 27 IN, UR-6, VIOLET

## (undated) DEVICE — SUTURE, VICRYL, 3-0, 27 IN, CT-2, UNDYED

## (undated) DEVICE — SLEEVE, VASO PRESS, CALF GARMENT, MEDIUM, GREEN

## (undated) DEVICE — DISSECTOR, LAPAROSCOPIC, 5MM BLUNT TIP

## (undated) DEVICE — ELECTRODE, ELECTROSURGICAL, BLADE, INSULATED, ENT/IMA, STERILE

## (undated) DEVICE — STRIP, SKIN CLOSURE, COMPOUND BENZION TINCTURE 0.6ML

## (undated) DEVICE — CLIP, ENDO APPLIER LIGAMAX 5MM

## (undated) DEVICE — IRRIGATOR, WOUND, HYDRO SURG PLUS, W/O TIP, DISP